# Patient Record
Sex: FEMALE | Race: AMERICAN INDIAN OR ALASKA NATIVE | Employment: OTHER | URBAN - METROPOLITAN AREA
[De-identification: names, ages, dates, MRNs, and addresses within clinical notes are randomized per-mention and may not be internally consistent; named-entity substitution may affect disease eponyms.]

---

## 2021-12-08 ENCOUNTER — TRANSFERRED RECORDS (OUTPATIENT)
Dept: HEALTH INFORMATION MANAGEMENT | Facility: CLINIC | Age: 56
End: 2021-12-08

## 2022-01-31 ENCOUNTER — MEDICAL CORRESPONDENCE (OUTPATIENT)
Dept: HEALTH INFORMATION MANAGEMENT | Facility: CLINIC | Age: 57
End: 2022-01-31

## 2022-08-08 ENCOUNTER — TELEPHONE (OUTPATIENT)
Dept: PALLIATIVE MEDICINE | Facility: CLINIC | Age: 57
End: 2022-08-08

## 2022-08-08 ENCOUNTER — OFFICE VISIT (OUTPATIENT)
Dept: INTERNAL MEDICINE | Facility: CLINIC | Age: 57
End: 2022-08-08
Payer: COMMERCIAL

## 2022-08-08 VITALS
SYSTOLIC BLOOD PRESSURE: 139 MMHG | WEIGHT: 127 LBS | HEART RATE: 77 BPM | TEMPERATURE: 98.1 F | OXYGEN SATURATION: 98 % | RESPIRATION RATE: 18 BRPM | DIASTOLIC BLOOD PRESSURE: 85 MMHG | BODY MASS INDEX: 20.41 KG/M2 | HEIGHT: 66 IN

## 2022-08-08 DIAGNOSIS — G43.719 INTRACTABLE CHRONIC MIGRAINE WITHOUT AURA AND WITHOUT STATUS MIGRAINOSUS: Primary | ICD-10-CM

## 2022-08-08 PROCEDURE — 99203 OFFICE O/P NEW LOW 30 MIN: CPT | Performed by: INTERNAL MEDICINE

## 2022-08-08 RX ORDER — BUPRENORPHINE AND NALOXONE 8; 2 MG/1; MG/1
FILM, SOLUBLE BUCCAL; SUBLINGUAL
Qty: 90 EACH | Refills: 0 | Status: SHIPPED | OUTPATIENT
Start: 2022-08-08 | End: 2022-11-01

## 2022-08-08 RX ORDER — KETOROLAC TROMETHAMINE 30 MG/ML
INJECTION, SOLUTION INTRAMUSCULAR; INTRAVENOUS
COMMUNITY
Start: 2021-12-14

## 2022-08-08 RX ORDER — ONDANSETRON 8 MG/1
TABLET, ORALLY DISINTEGRATING ORAL
COMMUNITY
Start: 2022-06-29

## 2022-08-08 RX ORDER — HYDROMORPHONE HYDROCHLORIDE 4 MG/1
4 TABLET ORAL EVERY 6 HOURS PRN
Qty: 50 TABLET | Refills: 0 | Status: SHIPPED | OUTPATIENT
Start: 2022-08-08 | End: 2022-10-13

## 2022-08-08 RX ORDER — LINACLOTIDE 290 UG/1
CAPSULE, GELATIN COATED ORAL
COMMUNITY
Start: 2022-06-21

## 2022-08-08 RX ORDER — MODAFINIL 200 MG/1
200 TABLET ORAL
COMMUNITY
Start: 2022-04-08

## 2022-08-08 RX ORDER — NORTRIPTYLINE HYDROCHLORIDE 75 MG/1
75 CAPSULE ORAL
COMMUNITY
End: 2023-01-10

## 2022-08-08 ASSESSMENT — ENCOUNTER SYMPTOMS
MUSCULOSKELETAL NEGATIVE: 1
CARDIOVASCULAR NEGATIVE: 1
RESPIRATORY NEGATIVE: 1
GASTROINTESTINAL NEGATIVE: 1
HEADACHES: 1
CONSTITUTIONAL NEGATIVE: 1
SEIZURES: 0

## 2022-08-08 NOTE — PATIENT INSTRUCTIONS
Pain referral placed for chronic migraine management.    Will provide 1 refill of Suboxone and Dilaudid as previously prescribed.

## 2022-08-08 NOTE — PROGRESS NOTES
Assessment & Plan     Intractable chronic migraine without aura and without status migrainosus  At this time, I will place a referral for the patient to establish with Dr. Antonette Gamble in the pain clinic.  Review of the [place] prescription monitoring service does show that she has been very consistent with her use of medications, and there was no concerning findings suggestive of misuse of her medications.  I did provide her with a refill of her Suboxone 8-2 mg per film with instructions to use 3 times per day as needed for chronic pain management secondary to migraines.  I also provide her with a one-time refill of Dilaudid 4 mg with instructions take 1 tab by mouth every 4 hours as needed for severe pain.  Patient did receive 50 tablets with no refills.  Side effects of each medication were discussed.  Patient and her  had no further questions or concerns at this time.  - Pain Management  Referral; Future  - HYDROmorphone (DILAUDID) 4 MG tablet; Take 1 tablet (4 mg) by mouth every 6 hours as needed for severe pain One tablet PRN migraine may repeat every 4 hrs as needed for chronic migraine pain.  - buprenorphine HCl-naloxone HCl (SUBOXONE) 8-2 MG per film; 6-8 mg sublingual TID PRN pain (may use in combination with 2 mg film) for chronic migraine pain.    Prescription drug management  30 minutes spent on the date of the encounter doing chart review, history and exam, documentation and further activities per the note       See Patient Instructions    Return in about 3 months (around 11/8/2022) for Routine preventive.    Patrice San MD  [facility] ELIEZER Davis is a 57 year old, presenting for the following health issues:  Headache      Patient is a 57-year-old  female who presents to the clinic as a new patient.  Her main concern today is in regards to a referral to a pain specialist.  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  Patient went through a process of being placed on several antiepileptic medications, and she was finally placed on a regimen of Tegretol and Topamax by her neurologist.  This has kept her seizure-free for approximately 20 years.  Has had issues with persistent migraine pain since these initial episodes that occur.  Her neurologist had tried a number of different medications in attempt to help control her migraine headaches, but unfortunately nothing was very successful.  She had previously been on nortriptyline, atenolol, and Imitrex.  She was ultimately placed on Demerol for management of her refractory migraines.  Patient was subsequent established with a pain specialist by the name of Dr. Howard Mccarthy.  Dr. Mccarthy had found a regimen of Suboxone and as needed Dilaudid that did help keep her migraine pain under relatively good control.  Unfortunately, Dr. Mccarthy did suffer some health issues, and is not currently practicing.  Patient was recommended to establish with Dr. Antonette Gamble for ongoing pain management.  Given that patient is new to the [facility], she did have to be seen in the clinic for a referral today.  Patient did receive her last prescription of her pain medication from Dr. Mccarthy approximately 2 months ago.  He did typically provide her with refills of her Suboxone and Dilaudid every 2 months.  Patient utilizes Dilaudid 4 mg on an as-needed basis for her migraine headaches.  She typically uses this medication 2-3 times per week at most.  She does utilize Suboxone 8 mg / 2 mg sublingually 3 times per day.  Her  is a physician who does assist her with management of her medications.  Her  does carefully track how frequently she is using her medications as well.  Patient is requesting a referral so that he can establish with Dr. Gamble for ongoing pain management.  " She did also request a refill of her prescriptions to cover her until she can be seen in the pain clinic.    History of Present Illness       Reason for visit:  Referral to a pain management specialist, Dr. Gamble    She eats 2-3 servings of fruits and vegetables daily.She consumes 1 sweetened beverage(s) daily.She exercises with enough effort to increase her heart rate 20 to 29 minutes per day.  She exercises with enough effort to increase her heart rate 4 days per week.   She is taking medications regularly.         Review of Systems   Constitutional: Negative.    HENT: Negative.    Respiratory: Negative.    Cardiovascular: Negative.    Gastrointestinal: Negative.    Breasts:  negative.    Genitourinary: Negative.    Musculoskeletal: Negative.    Neurological: Positive for headaches. Negative for seizures.            Objective    Blood pressure 139/85, pulse 77, temperature 98.1  F (36.7  C), resp. rate 18, height 1.676 m (5' 6\"), weight 57.6 kg (127 lb), SpO2 98 %, not currently breastfeeding.      Physical Exam  Vitals reviewed.   Constitutional:       General: She is not in acute distress.     Appearance: She is well-developed.   HENT:      Right Ear: Tympanic membrane and external ear normal.      Left Ear: Tympanic membrane and external ear normal.      Nose: Nose normal.      Mouth/Throat:      Pharynx: No oropharyngeal exudate.   Eyes:      General:         Right eye: No discharge.         Left eye: No discharge.      Conjunctiva/sclera: Conjunctivae normal.      Pupils: Pupils are equal, round, and reactive to light.   Neck:      Thyroid: No thyromegaly.      Trachea: No tracheal deviation.   Cardiovascular:      Rate and Rhythm: Normal rate and regular rhythm.      Pulses: Normal pulses.      Heart sounds: Normal heart sounds, S1 normal and S2 normal. No murmur heard.    No friction rub. No S3 or S4 sounds.   Pulmonary:      Effort: Pulmonary effort is normal. No respiratory distress.      Breath sounds: " Normal breath sounds. No wheezing or rales.   Abdominal:      General: Bowel sounds are normal.      Palpations: Abdomen is soft. There is no mass.      Tenderness: There is no abdominal tenderness.   Lymphadenopathy:      Cervical: No cervical adenopathy.   Skin:     General: Skin is warm and dry.      Findings: No rash.   Neurological:      Mental Status: She is alert and oriented to person, place, and time.      Motor: No abnormal muscle tone.      Deep Tendon Reflexes: Reflexes are normal and symmetric.                .  ..

## 2022-08-08 NOTE — TELEPHONE ENCOUNTER
A referral was received for this patient to schedule with Antonette Gamble, per Dr. Mccarthy's request. The provider the patient is being referred to is currently not taking new patients, please advise on scheduling

## 2022-08-11 NOTE — TELEPHONE ENCOUNTER
Routing to supervisory team to address    Leta GARCIA RN Care Coordinator  [facility]

## 2022-10-10 DIAGNOSIS — G43.719 INTRACTABLE CHRONIC MIGRAINE WITHOUT AURA AND WITHOUT STATUS MIGRAINOSUS: ICD-10-CM

## 2022-10-10 NOTE — TELEPHONE ENCOUNTER
Routing refill request to provider for review/approval because:  Drug not on the [facility] refill protocol     See message as well.

## 2022-10-13 RX ORDER — HYDROMORPHONE HYDROCHLORIDE 4 MG/1
TABLET ORAL
Qty: 50 TABLET | Refills: 0 | Status: SHIPPED | OUTPATIENT
Start: 2022-10-13

## 2022-10-13 NOTE — TELEPHONE ENCOUNTER
What message are you talking about?  I do not see any other message associated with this refill request.      Her appointment with pain clinic is scheduled for 111/22.

## 2022-10-28 NOTE — PROGRESS NOTES
"[facility] Consultation    Date of visit: 11/1/2022    Reason for consultation:    Susan Javed is a 57 year old female who is seen in consultation today at the request of her primary care physician, Yomi Collier.     Consultation and Evaluation for: \"intractible migraine\"    Review of [place] Prescription Monitoring Program (): Today I have also reviewed the patient's history of controlled substance use, as provided by [place] licensed pharmacies and prescriber dispensers. YES  Dilaudid 4mg #50 10/14/2022  Modafinil 200mg #180 9/29/2022  Suboxone 8mg film #90 9/13/2022    Review of Pain Questionnaire: Please see the [facility] health questionnaire, which the patient completed and reviewed with me in detail.    Review of Electronic Chart: Today I have also reviewed available medical information in the patient's medical record at [place] ([facility]), including relevant provider notes, laboratory work, and imaging.       Chief Complaint:    Chief Complaint   Patient presents with     Pain       Pain history:  Susan Javed is a 57 year old female who presents for initial evaluation of the following chronic pain complaints.     Had been seeing Dr. Howard Mccarthy for the past 22 years, saw him here at the [place] and then followed him to [place] [facility]. He referrered her to Dr. Gamble for further management.     Diagnosed with viral encephalitis in 1995. At age 31 had a grand mal seizure around this same time. Tried multiple anti-epileptics. Is now on tegretol and topiramate and this works very well for her seizures.     Since this time has migraines. Located in the left temporal area. Associated with nausea and vomitting. Usually occur after midnight. Tried many medications. Was on daily durogesic patch and hydropmorphone and was on this for about 10 years but was in a fog. So she started suboxone in 2010 and felt " cognitively much better. Suboxone 8mg TID, often only takes twice a day. Also had a perscription for 2mg to be able to take more when needed, she uses this very rarely.  She still has headaches every three days but she but catched them before they become more severe. To do this she takes 8 mg hydromorphone. She does have daily headaches that she takes excedrine for ( one in the morning). She typically has one bad migraine a month with nausea. Does not use NSAIDs. Has been on atenolol for 20 years for headaches. Rarely uses IM ketorolac injections.    Has tried acupuncture, posture control, biofeedback, cephaly, botox. She stopped botox when Dr. Mccarthy went on medical leave and didn't notice a big difference after stopping this.     She is happy with her current regimen and her  thinks she is doing better this year than previously.     Exercises every day 30-40 minutes a day on the treadmill. 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 She likes to read and travel. 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     Her brother just  5 days ago.    Pain is described as Throbbing she does have photophobia, phonophobia, nausea and vomitting  Aggravating factors include: shifting light, traveling  Relieving factors include: medication, lying down, ice  Activity level/function:              Daily activities:  Able to do all daily activities            Work:  Not applicable  Recent family or social stressors:  death in family:  Her brother  5 day ago  Red Flags:       MEDICATIONS FOR PAIN:   Suboxone 8-2 TID PRN  Dilaudid 8mg q72 hr PRN  Nortiptyline 75 mg hs  Ketorolac 30 mg IM q6 hr prn   Atenolol    For epilepsy:  Carbamazepine   Topiramate 275 mg BID    Also:  Promethazine  Modafinil - daytime drowsyness      Past pain treatments:   Pain Clinic:  Yes [place] [facility]    PT:  Yes   Psychologist:  No  Self-care:  Yes \      INJECTIONS:   BOTOX injections in the past  with Dr. Mccarthy    Social History:  Home situation: Lives with her  in [place]  Occupation/Schooling: retired physician  Tobacco use: none  Drug use: none  Alcohol use: none  History of chemical dependency treatment: none  Mental health admissions: none    Past Medical History:  No past medical history on file.    Past Surgical History:  No past surgical history on file.    Medications:  Current Outpatient Medications   Medication Sig Dispense Refill     ALEVE 220 MG OR TABS 1 TABLET BID PRN with menses       ATENOLOL 25 MG OR TABS 3 tabs BID       buprenorphine HCl-naloxone HCl (SUBOXONE) 8-2 MG per film 6-8 mg sublingual TID PRN pain (may use in combination with 2 mg film) for chronic migraine pain. 90 each 0     CALCIUM + D OR 1 tablet daily (Patient not taking: Reported on 8/8/2022)       Glucosamine-Chondroitin-MSM (GLUCOSAMINE-CHONDROITIN DS) TABS Take  by mouth. 750/500 mg., 3 tabs daily        HYDROmorphone (DILAUDID) 4 MG tablet TAKE 1 TABLET (4 MG) BY MOUTH EVERY 6 HOURS AS NEEDED FOR SEVERE MIGRAINE PAIN,  MAY REPEAT EVERY 4 HOURS AS NEEDED FOR CHRONIC MIGRAINE PAIN. 50 tablet 0     ketorolac (TORADOL) 30 MG/ML injection ADMINISTER 1 ML IN THE MUSCLE EVERY 6 HOURS       LINZESS 290 MCG capsule TAKE 1 CAPSULE BY MOUTH EVERY DAY       methylprednisoLONE (MEDROL, MANDEEP,) 4 MG tablet Use per directions on the mandeep 1 tablet 0     modafinil (PROVIGIL) 200 MG tablet Take 200 mg by mouth       modafinil (PROVIGIL) 200 MG tablet 1-2 po q day 60 tablet 3     Multiple Vitamin (MULTIVITAMIN OR) Take 1 tablet by mouth daily.       nortriptyline (PAMELOR) 75 MG capsule Take 75 mg by mouth       nortriptyline (PAMELOR) 75 MG capsule take 75 mg by mouth At Bedtime.       ondansetron (ZOFRAN ODT) 8 MG ODT tab DISSOLVE 1 TABLET UNDER THE TONGUE TWICE DAILY AS NEEDED       promethazine (PHENERGAN) 25 MG suppository Place 1 suppository rectally every 6 hours as needed for nausea. 12 each 0     Promethazine HCl  "(PHENERGAN) 50 MG/ML SOLN Inject  as directed. 1 amp PRN Nausea and vomiting (Patient not taking: Reported on 8/8/2022) 10 ampule 0     TEGRETOL XR## 400 MG OR TB12 1 TABLET TWICE DAILY       TOPAMAX 200 MG OR TABS 1 TABLET TWICE DAILY       TOPAMAX 25 MG OR TABS 3 tabs BID         Allergies:     Allergies   Allergen Reactions     Augmentin Rash     Sulfa Drugs Hives       Family history:  No family history on file.    ROS:  Constitutional, neuro, ENT, endocrine, pulmonary, cardiac, gastrointestinal, genitourinary, musculoskeletal, integument and psychiatric systems are negative, except as otherwise noted.    Physical Exam:  Vitals:    11/01/22 0930   BP: (!) 143/86   BP Location: Right leg   Patient Position: Sitting   Cuff Size: Adult Regular   Pulse: 84   Height: 1.676 m (5' 6\")       GENERAL: Healthy, alert and no distress  EYES: Eyes grossly normal to inspection.  No discharge or erythema, or obvious scleral/conjunctival abnormalities.  RESP: No audible wheeze, cough, or visible cyanosis.  No visible retractions or increased work of breathing.    SKIN: Visible skin clear. No significant rash, abnormal pigmentation or lesions.  NEURO: Cranial nerves grossly intact.  Mentation and speech appropriate for age.  PSYCH: Mentation appears normal, affect normal/bright, judgement and insight intact, normal speech and appearance well-groomed.      ASSESSMENT/PLAN:  The following recommendations were given to the patient. Diagnosis, treatment options, risks, benefits, and alternatives were discussed, and all questions were answered. The patient expressed understanding of the plan for management.     1. Intractable chronic migraine without aura and without status migrainosus  The patient has a long history of migraine headaches.  She has been using the same regiment of medications now for many years with success in the reduction and intensity of her headaches.  She is using suboxone 16mg daily and dilaudid 4mg -8mg PRN to " abort her headaches. She does not wish to change her medication regiment at this time.  However, we did discuss using less suboxone 1-2mg more often to better manage her headaches.      I was upfront in letting her know that I am not a headache specialist and she may want to have neurology also follow her if things change or worsen.  I am comfortable continuing this regiment for now.  Could consider botox injections again in the future if things worsen, however, she has been off this now for about 10 months and has not noticed any difference in her headache strength or frequency.     She denies any history of abuse/misuse of her medication and I could not find any evidence of OUD on chart review.  Dr. Mccarthy was using migraine HA's as the diagnosis code associated with her suboxone use.     She is not a patient of the [facility].  She went to a one time visit with a PCP in order to get a consult to come and see me but plans to continue with her long term PCP and [facility].     I am not comfortable prescribing modafinil.  She will need to get this from either her PCP or ask her primary for a sleep medicine evaluation as narcolepsy is the only approved diagnosis for this medication and she has never been evaluated for this.  It sounds like this was started to combat the side effect of sedation she was having when she was previously on fentanyl patches.     - Pain Management  Referral  - buprenorphine HCl-naloxone HCl (SUBOXONE) 8-2 MG per film; 4mg or 1/2 film QID for chronic migraine pain for a total of 16mg daily  Dispense: 60 each; Refill: 5    2. Encounter for long-term (current) use of high-risk medication  High Risk Drug Monitoring?  YES  Drug being monitored: Suboxone   Reason for drug: Opioid Use Disorder  What is being monitored?: Dosage, Cravings, Trigger, side effects, and abstinence.    - Drug Confirmation Panel Urine with Creatinine; Future  - Ethyl Glucuronide Screen with Reflex to  Confirmation, Urine; Future    UDS AND CONTRACT WERE DONE TODAY      MEDICATIONS:     Orders Placed This Encounter   Medications     buprenorphine HCl-naloxone HCl (SUBOXONE) 8-2 MG per film     Simg or 1/2 film QID for chronic migraine pain for a total of 16mg daily     Dispense:  60 each     Refill:  5     NADEAN: [id number]          - Continue other medications without change           FOLLOW UP: every 3-6 months in clinic -  2023      BILLING TIME DOCUMENTATION:   The total TIME spent on this patient on the date of the encounter/appointment was 72 minutes.      TOTAL TIME includes:   Time spent preparing to see the patient (reviewing records and tests) - 2 min  Time spent face to face (or over the phone) with the patient - 56 min  Time spent ordering tests, medications, procedures and referrals - 2 min  Time spent Referring and communicating with other healthcare professionals - 0 min  Time spent documenting clinical information in Epic - 12 min       SHAHEED TERAN MD   Pain Management & Addiction Medicine

## 2022-11-01 ENCOUNTER — OFFICE VISIT (OUTPATIENT)
Dept: PALLIATIVE MEDICINE | Facility: CLINIC | Age: 57
End: 2022-11-01
Attending: INTERNAL MEDICINE
Payer: COMMERCIAL

## 2022-11-01 VITALS
HEIGHT: 66 IN | BODY MASS INDEX: 20.5 KG/M2 | HEART RATE: 84 BPM | DIASTOLIC BLOOD PRESSURE: 86 MMHG | SYSTOLIC BLOOD PRESSURE: 143 MMHG

## 2022-11-01 DIAGNOSIS — G43.719 INTRACTABLE CHRONIC MIGRAINE WITHOUT AURA AND WITHOUT STATUS MIGRAINOSUS: Primary | ICD-10-CM

## 2022-11-01 DIAGNOSIS — Z79.899 ENCOUNTER FOR LONG-TERM (CURRENT) USE OF HIGH-RISK MEDICATION: ICD-10-CM

## 2022-11-01 LAB — CREAT UR-MCNC: 58 MG/DL

## 2022-11-01 PROCEDURE — 80307 DRUG TEST PRSMV CHEM ANLYZR: CPT | Mod: 90 | Performed by: ANESTHESIOLOGY

## 2022-11-01 PROCEDURE — 80307 DRUG TEST PRSMV CHEM ANLYZR: CPT | Performed by: ANESTHESIOLOGY

## 2022-11-01 PROCEDURE — 99205 OFFICE O/P NEW HI 60 MIN: CPT | Performed by: ANESTHESIOLOGY

## 2022-11-01 PROCEDURE — 80321 ALCOHOLS BIOMARKERS 1OR 2: CPT | Mod: 90 | Performed by: ANESTHESIOLOGY

## 2022-11-01 RX ORDER — BUPRENORPHINE AND NALOXONE 8; 2 MG/1; MG/1
FILM, SOLUBLE BUCCAL; SUBLINGUAL
Qty: 60 EACH | Refills: 5 | Status: SHIPPED | OUTPATIENT
Start: 2022-11-01 | End: 2023-04-12

## 2022-11-01 ASSESSMENT — PAIN SCALES - GENERAL: PAINLEVEL: NO PAIN (0)

## 2022-11-01 NOTE — LETTER
Opioid / Opioid Plus Controlled Substance Agreement    This is an agreement between you and your provider about the safe and appropriate use of controlled substance/opioids prescribed by your care team. Controlled substances are medicines that can cause physical and mental dependence (abuse).    There are strict laws about having and using these medicines. We here at [facility] are committing to working with you in your efforts to get better. To support you in this work, we ll help you schedule regular office appointments for medicine refills. If we must cancel or change your appointment for any reason, we ll make sure you have enough medicine to last until your next appointment.     As a Provider, I will:    Listen carefully to your concerns and treat you with respect.     Recommend a treatment plan that I believe is in your best interest. This plan may involve therapies other than opioid pain medication.     Talk with you often about the possible benefits, and the risk of harm of any medicine that we prescribe for you.     Provide a plan on how to taper (discontinue or go off) using this medicine if the decision is made to stop its use.    As a Patient, I understand that opioid(s):     Are a controlled substance prescribed by my care team to help me function or work and manage my condition(s).     Are strong medicines and can cause serious side effects such as:    Drowsiness, which can seriously affect my driving ability    A lower breathing rate, enough to cause death    Harm to my thinking ability     Depression     Abuse of and addiction to this medicine    Need to be taken exactly as prescribed. Combining opioids with certain medicines or chemicals (such as illegal drugs, sedatives, sleeping pills, and benzodiazepines) can be dangerous or even fatal. If I stop opioids suddenly, I may have severe withdrawal symptoms.    Do not work for all types of pain nor for all patients. If they re not helpful, I may  be asked to stop them.        The risks, benefits and side effects of these medicine(s) were explained to me. I agree that:  1. I will take part in other treatments as advised by my care team. This may be psychiatry or counseling, physical therapy, behavioral therapy, group treatment or a referral to a specialist.     2. I will keep all my appointments. I understand that this is part of the monitoring of opioids. My care team may require an office visit for EVERY opioid/controlled substance refill. If I miss appointments or don t follow instructions, my care team may stop my medicine.    3. I will take my medicines as prescribed. I will not change the dose or schedule unless my care team tells me to. There will be no refills if I run out early.     4. I may be asked to come to the clinic and complete a urine drug test or complete a pill count at any time. If I don t give a urine sample or participate in a pill count, the care team may stop my medicine.    5. I will only receive prescriptions from this clinic for chronic pain. If I am treated by another provider for acute pain issues, I will tell them that I am taking opioid pain medication for chronic pain and that I have a treatment agreement with this provider. I will inform my [facility] care team within one business day if I am given a prescription for any pain medication by another healthcare provider. My [facility] care team can contact other providers and pharmacists about my use of any medicines.    6. It is up to me to make sure that I don t run out of my medicines on weekends or holidays. If my care team is willing to refill my opioid prescription without a visit, I must request refills only during office hours. Refills may take up to 3 business days to process. I will use one pharmacy to fill all my opioid and other controlled substance prescriptions. I will notify the clinic about any changes to my insurance or medication  availability.    7. I am responsible for my prescriptions. If the medicine/prescription is lost, stolen or destroyed, it will not be replaced. I also agree not to share controlled substance medicines with anyone.    8. I am aware I should not use any illegal or recreational drugs. I agree not to drink alcohol unless my care team says I can.       9. If I enroll in the [place] [facility] Cannabis program, I will tell my care team prior to my next refill.     10. I will tell my care team right away if I become pregnant, have a new medical problem treated outside of my regular clinic, or have a change in my medications.    11. I understand that this medicine can affect my thinking, judgment and reaction time. Alcohol and drugs affect the brain and body, which can affect the safety of my driving. Being under the influence of alcohol or drugs can affect my decision-making, behaviors, personal safety, and the safety of others. Driving while impaired (DWI) can occur if a person is driving, operating, or in physical control of a car, motorcycle, boat, snowmobile, ATV, motorbike, off-road vehicle, or any other motor vehicle ([place] Statute 169A.20). I understand the risk if I choose to drive or operate any vehicle or machinery.    I understand that if I do not follow any of the conditions above, my prescriptions or treatment may be stopped or changed.          Opioids  What You Need to Know    What are opioids?   Opioids are pain medicines that must be prescribed by a doctor. They are also known as narcotics.     Examples are:   1. morphine (MS Contin, Radha)  2. oxycodone (Oxycontin)  3. oxycodone and acetaminophen (Percocet)  4. hydrocodone and acetaminophen (Vicodin, Norco)   5. fentanyl patch (Duragesic)   6. hydromorphone (Dilaudid)   7. methadone  8. codeine (Tylenol #3)     What do opioids do well?   Opioids are best for severe short-term pain such as after a surgery or injury. They may work well for cancer pain. They may  help some people with long-lasting (chronic) pain.     What do opioids NOT do well?   Opioids never get rid of pain entirely, and they don t work well for most patients with chronic pain. Opioids don t reduce swelling, one of the causes of pain.                                    Other ways to manage chronic pain and improve function include:       Treat the health problem that may be causing pain    Anti-inflammation medicines, which reduce swelling and tenderness, such as ibuprofen (Advil, Motrin) or naproxen (Aleve)    Acetaminophen (Tylenol)    Antidepressants and anti-seizure medicines, especially for nerve pain    Topical treatments such as patches or creams    Injections or nerve blocks    Chiropractic or osteopathic treatment    Acupuncture, massage, deep breathing, meditation, visual imagery, aromatherapy    Use heat or ice at the pain site    Physical therapy     Exercise    Stop smoking    Take part in therapy       Risks and side effects     Talk to your doctor before you start or decide to keep taking opioids. Possible side effects include:      Lowering your breathing rate enough to cause death    Overdose, including death, especially if taking higher than prescribed doses    Worse depression symptoms; less pleasure in things you usually enjoy    Feeling tired or sluggish    Slower thoughts or cloudy thinking    Being more sensitive to pain over time; pain is harder to control    Trouble sleeping or restless sleep    Changes in hormone levels (for example, less testosterone)    Changes in sex drive or ability to have sex    Constipation    Unsafe driving    Itching and sweating    Dizziness    Nausea, throwing up and dry mouth    What else should I know about opioids?    Opioids may lead to dependence, tolerance, or addiction.      Dependence means that if you stop or reduce the medicine too quickly, you will have withdrawal symptoms. These include loose poop (diarrhea), jitters, flu-like symptoms,  nervousness and tremors. Dependence is not the same as addiction.                       Tolerance means needing higher doses over time to get the same effect. This may increase the chance of serious side effects.      Addiction is when people improperly use a substance that harms their body, their mind or their relations with others. Use of opiates can cause a relapse of addiction if you have a history of drug or alcohol abuse.      People who have used opioids for a long time may have a lower quality of life, worse depression, higher levels of pain and more visits to doctors.    You can overdose on opioids. Take these steps to lower your risk of overdose:    1. Recognize the signs:  Signs of overdose include decrease or loss of consciousness (blackout), slowed breathing, trouble waking up and blue lips. If someone is worried about overdose, they should call 911.    2. Talk to your doctor about Narcan (naloxone).   If you are at risk for overdose, you may be given a prescription for Narcan. This medicine very quickly reverses the effects of opioids.   If you overdose, a friend or family member can give you Narcan while waiting for the ambulance. They need to know the signs of overdose and how to give Narcan.     3. Don't use alcohol or street drugs.   Taking them with opioids can cause death.    4. Do not take any of these medicines unless your doctor says it s OK. Taking these with opioids can cause death:    Benzodiazepines, such as lorazepam (Ativan), alprazolam (Xanax) or diazepam (Valium)    Muscle relaxers, such as cyclobenzaprine (Flexeril)    Sleeping pills like zolpidem (Ambien)     Other opioids      How to keep you and other people safe while taking opioids:    1. Never share your opioids with others.  Opioid medicines are regulated by the Drug Enforcement Agency ([place]). Selling or sharing medications is a criminal act.    2. Be sure to store opioids in a secure place, locked up if possible. Young children  can easily swallow them and overdose.    3. When you are traveling with your medicines, keep them in the original bottles. If you use a pill box, be sure you also carry a copy of your medicine list from your clinic or pharmacy.    4. Safe disposal of opioids    Most pharmacies have places to get rid of medicine, called disposal kiosks. Medicine disposal options are also available in every [facility]. Search your county and  medication disposal  to find more options. You can find more details at:  [email or web address]     I agree that my provider, clinic care team, and pharmacy may work with any city, state or federal law enforcement agency that investigates the misuse, sale, or other diversion of my controlled medicine. I will allow my provider to discuss my care with, or share a copy of, this agreement with any other treating provider, pharmacy or emergency room where I receive care.    I have read this agreement and have asked questions about anything I did not understand.    _______________________________________________________  Patient Signature - Susan Javed _____________________                   Date     _______________________________________________________  Provider Signature - Antonette Gamble MD   _____________________                   Date     _______________________________________________________  Witness Signature (required if provider not present while patient signing)   _____________________                   Date

## 2022-11-01 NOTE — PATIENT INSTRUCTIONS
Suboxone will have refills on it so you call the pharmacy for refills. It is okay to cut the films.   For the Dilaudid you are going to call here - the pain clinic for all refills.     ----------------------------------------------------------------  Clinic Number:  [phone]   Call with any questions about your care and for scheduling assistance.   Calls are returned Monday through Friday between 8 AM and 4:30 PM. We usually get back to you within 2 business days depending on the issue/request.    If we are prescribing your medications:  For opioid medication refills, call the clinic or send a [facility] message 7 days in advance.  Please include:  Name of requested medication  Name of the pharmacy.  For non-opioid medications, call your pharmacy directly to request a refill. Please allow 3-4 days to be processed.   Per [place] State Law:  All controlled substance prescriptions must be filled within 30 days of being written.    For those controlled substances allowing refills, pickup must occur within 30 days of last fill.      We believe regular attendance is key to your success in our program!    Any time you are unable to keep your appointment we ask that you call us at least 24 hours in advance to cancel.This will allow us to offer the appointment time to another patient.   Multiple missed appointments may lead to dismissal from the clinic.

## 2022-11-03 LAB
BUPRENORPHINE UR CFM-MCNC: 154 NG/ML
BUPRENORPHINE/CREAT UR: 266 NG/MG {CREAT}
ETHYL GLUCURONIDE UR QL SCN: POSITIVE NG/ML
HYDROMORPHONE UR CFM-MCNC: 280 NG/ML
HYDROMORPHONE/CREAT UR: 483 NG/MG {CREAT}
NALOXONE UR CFM-MCNC: 1417 NG/ML
NALOXONE: 2443 NG/MG {CREAT}
NORBUPRENORPHINE UR CFM-MCNC: 1361 NG/ML
NORBUPRENORPHINE/CREAT UR: 2347 NG/MG {CREAT}

## 2022-11-05 LAB
ETHYL GLUCURONIDE UR CFM-MCNC: NORMAL NG/ML
ETHYL SULFATE UR CFM-MCNC: 5573 NG/ML

## 2022-11-15 ENCOUNTER — TELEPHONE (OUTPATIENT)
Dept: PALLIATIVE MEDICINE | Facility: CLINIC | Age: 57
End: 2022-11-15

## 2022-11-15 NOTE — TELEPHONE ENCOUNTER
Called pt- scheduled for 12/20/22 in person, requested in person, offered 12/06 but is unable to come in prior due to travel for 3 weeks. Aware that provider unable to continue prescribing Dilaudid due to UDS results.   States that does have refills on file for suboxone but will be due to start while on vacation 12/01/22 start, leaving the 25th. Inquired if able to  prior to leaving vacation. Discussed with provider, ok to  early. Called pharmacy and advised ok to  early    Yashira TUBBS, RN Care Coordinator  [facility]  Pain Management    
Called pt. LM to call back to discuss at home number    Called mobile listed. LM to call back to discuss    Yashira TUBBS, RN Care Coordinator  [facility]  Pain Management    
[facility] Call Center    Phone Message    May a detailed message be left on voicemail: yes     Reason for Call: Other: Patient is returning a call to RN regarding missed phone call to move appointment up sooner. Please call to discuss.      Action Taken: Other: Pain    Travel Screening: Not Applicable                                                                      
Please call the patient -     Her UDS came up positive for alcohol use.      As stated on the opioid contract she signed, alcohol is strictly prohibited when using the medications she is on.     I will need an appointment with her sooner than currently scheduled to discuss this and come up with an alternative plan as I can no longer prescribe dilaudid.     Okay to use an urgent slot.     Antonette Gamble MD   
same name as above

## 2023-01-09 NOTE — PROGRESS NOTES
Susan is a 57 year old who is being evaluated via a billable video visit.      How would you like to obtain your AVS? MyChart  If the video visit is dropped, the invitation should be resent by: Text to cell phone: [phone]  Will anyone else be joining your video visit? No      Video-Visit Details    Type of service:  Video Visit   Video Start Time: 1001  Video End Time:1029    Originating Location (pt. Location): Home    Distant Location (provider location):  On-site  Platform used for Video Visit: [facility]      Date of visit: 1/10/2023    Chief complaint:   Chief Complaint   Patient presents with     Pain       Interval history:  Susan Javed is a 57 year old female last seen by me for INITIAL CONSULT on 11/1/2022 and for FOLLOW UP- today is her first follow up.       Since her last visit, Susan Javed reports:    - Doing okay  - [place] are going to the [place] and will be there until April 2022.   - Had alcohol in her UDS on initial consult and dilaudid prescription was cancelled.  She had a backup stash and has stayed on it since her initial consult with me.   - She is trying to decrease over time and her goal is to get off traditional opioids.  She was previously taking #25 tablets/month and last month took #17/month.   - Went and established care at [facility] last week and they are now managing her dilaudid.  She did a UDS and contract with them and they are going to help her get off it by slowly decreasing it month by month.   - They also are starting her on #2 new migraine medications that are non opioid and she will start these once a PA is approved.    - She is using buprenorphine 16mg daily. 4mg upon waking, 4mg lunch, 4mg dinner and 4mg PRN.  She does not use it after 4pm.    - Had been seeing Dr. Howard Mccarthy for the past 22 years, saw him here at the [facility] and then followed him to [facility]. She was referred to see my after he left his practice a few months ago   - Diagnosed with viral encephalitis in 1995. At age 31 she had a grand mal seizure. Tried multiple anti-epileptics. Is now on tegretol and topiramate and this works very well for her seizures.   - Primary pain complaint is migraine HA which started after her encephalitis.   - Located in the left temporal area. Associated with nausea and vomitting. Usually occur after midnight. Tried many medications. Was on daily durogesic patch and hydropmorphone and was on this for about 10 years but was in a fog. So she started suboxone in 2010 and felt cognitively much better. Headaches every 3 days.   She manages these with 8 mg hydromorphone PRN. She does have daily headaches that she takes excedrine for ( one in the morning). She typically has one really bad migraine a month with nausea. Does not use NSAIDs.   - Has been on atenolol for 20 years for headaches. Rarely uses IM ketorolac injections.  - Has tried acupuncture, posture control, biofeedback, cephaly, botox.    - Exercises every day 30-40 minutes a day on the treadmill.  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    - Her brother passed away 5 days prior to her initial consult and she admits to drinking at that time.  She states she is no longer drinking alcohol.   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     [place]  REVIEWED TODAY: YES  DILAUDID 4MG #20 1/5/2023  SUBOXONE 8MG #60 1/2/2023  MODAFINIL 200MG #90 12/29/2022    UDS and CONTRACT DONE on 11/1/2022 - positive for ETG, ETS and buprenorphine     MEDICATIONS FOR PAIN:   SUBOXONE 8MG BID  DILAUDID 4MG PRN - prescribed by [facility]  PAMELOR 75MG at bedtime  TOPAMAX 200MG DAILY & 25MG TID (for epilepsy)  ATENOLOL 75MG BID  TORADOL IM PRN     INJECTIONS/SURGERY:  BOTOX injections in the past with Dr. Mccarthy - [place]      Medications:  Current Outpatient Medications   Medication Sig Dispense  Refill     ALEVE 220 MG OR TABS 1 TABLET BID PRN with menses       ATENOLOL 25 MG OR TABS 3 tabs BID       buprenorphine HCl-naloxone HCl (SUBOXONE) 8-2 MG per film 4mg or 1/2 film QID for chronic migraine pain for a total of 16mg daily 60 each 5     CALCIUM + D OR 1 tablet daily (Patient not taking: Reported on 8/8/2022)       HYDROmorphone (DILAUDID) 4 MG tablet TAKE 1 TABLET (4 MG) BY MOUTH EVERY 6 HOURS AS NEEDED FOR SEVERE MIGRAINE PAIN,  MAY REPEAT EVERY 4 HOURS AS NEEDED FOR CHRONIC MIGRAINE PAIN. 50 tablet 0     ketorolac (TORADOL) 30 MG/ML injection ADMINISTER 1 ML IN THE MUSCLE EVERY 6 HOURS       LINZESS 290 MCG capsule TAKE 1 CAPSULE BY MOUTH EVERY DAY       methylprednisoLONE (MEDROL, MANDEEP,) 4 MG tablet Use per directions on the mandeep (Patient not taking: Reported on 11/1/2022) 1 tablet 0     modafinil (PROVIGIL) 200 MG tablet Take 200 mg by mouth       modafinil (PROVIGIL) 200 MG tablet 1-2 po q day 60 tablet 3     Multiple Vitamin (MULTIVITAMIN OR) Take 1 tablet by mouth daily.       nortriptyline (PAMELOR) 75 MG capsule Take 75 mg by mouth       nortriptyline (PAMELOR) 75 MG capsule take 75 mg by mouth At Bedtime.       ondansetron (ZOFRAN ODT) 8 MG ODT tab DISSOLVE 1 TABLET UNDER THE TONGUE TWICE DAILY AS NEEDED       promethazine (PHENERGAN) 25 MG suppository Place 1 suppository rectally every 6 hours as needed for nausea. 12 each 0     Promethazine HCl (PHENERGAN) 50 MG/ML SOLN Inject  as directed. 1 amp PRN Nausea and vomiting (Patient not taking: Reported on 8/8/2022) 10 ampule 0     TEGRETOL XR## 400 MG OR TB12 1 TABLET TWICE DAILY       TOPAMAX 200 MG OR TABS 1 TABLET TWICE DAILY       TOPAMAX 25 MG OR TABS 3 tabs BID         Physical Exam:  not currently breastfeeding.    GENERAL: Healthy, alert and no distress  EYES: Eyes grossly normal to inspection.  No discharge or erythema, or obvious scleral/conjunctival abnormalities.  RESP: No audible wheeze, cough, or visible cyanosis.  No visible  retractions or increased work of breathing.    SKIN: Visible skin clear. No significant rash, abnormal pigmentation or lesions.  NEURO: Cranial nerves grossly intact.  Mentation and speech appropriate for age.  PSYCH: Mentation appears normal, affect normal/bright, judgement and insight intact, normal speech and appearance well-groomed.         ASSESSMENT/PLAN:     1. Intractable migraine without aura and without status migrainosus  The patient has a long history of migraine headaches.  She has been using the same regiment of medications now for many years with success in the reduction and intensity of her headaches.  She is using suboxone 16mg daily and dilaudid 4mg -8mg PRN to abort her headaches.     At her initial consult with me in November we did a urine drug screen and it was positive for alcohol.  Upon finding this out I discontinued her Dilaudid prescription.  However it turns out she had a lot leftover at home and has not stopped taking this.  She recently went and establish care at [facility] pain [facility] and they are going to help her manage a Dilaudid taper.  She does hope to get off of traditional opiates over time.   She states they have other ideas for managing her headaches and have prescribed 2 nonopioid medications and she is waiting for a prior authorization to start these.  She is aware that they can take over care and also prescribe her Suboxone, however she would like to continue to have me prescribe this for now.    They are leaving next week for [place] for the winter.  She will return at the beginning of April.  She would like to follow up with me at that time in [place].      She is not a patient of the [facility] system.  She went to a one time visit with a PCP in order to get a consult to come and see me but plans to continue with her long term PCP and [facility]. I cannot see her records from [facility] at this time.      Continue suboxone - she has a prescription from her last  visit.   - buprenorphine HCl-naloxone HCl (SUBOXONE) 8-2 MG per film; 4mg or 1/2 film QID for chronic migraine pain for a total of 16mg daily  Dispense: 60 each; Refill: 5     2. Encounter for long-term (current) use of high-risk medication  High Risk Drug Monitoring?  YES  Drug being monitored: Suboxone   Reason for drug: Opioid Use Disorder  What is being monitored?: Dosage, Cravings, Trigger, side effects, and abstinence.        MEDICATIONS:   No orders of the defined types were placed in this encounter.      FOLLOW UP:  Every 3 months 4/5/2022 @ 2PM IN PERSON [place]        BILLING TIME DOCUMENTATION:   The total TIME spent on this patient on the date of the encounter/appointment was 44 minutes.      TOTAL TIME includes:   Time spent preparing to see the patient (reviewing records and tests) - 5 min  Time spent face to face (or over the phone) with the patient - 29 min  Time spent ordering tests, medications, procedures and referrals - 0 min  Time spent Referring and communicating with other healthcare professionals - 0 min  Time spent documenting clinical information in Epic - 10 min      SHAHEED TERAN MD   Pain Management & Addiction Medicine

## 2023-01-10 ENCOUNTER — VIRTUAL VISIT (OUTPATIENT)
Dept: PALLIATIVE MEDICINE | Facility: CLINIC | Age: 58
End: 2023-01-10
Payer: COMMERCIAL

## 2023-01-10 DIAGNOSIS — G43.019 INTRACTABLE MIGRAINE WITHOUT AURA AND WITHOUT STATUS MIGRAINOSUS: Primary | ICD-10-CM

## 2023-01-10 DIAGNOSIS — Z79.899 ENCOUNTER FOR LONG-TERM (CURRENT) USE OF HIGH-RISK MEDICATION: ICD-10-CM

## 2023-01-10 PROCEDURE — 99214 OFFICE O/P EST MOD 30 MIN: CPT | Mod: 95 | Performed by: ANESTHESIOLOGY

## 2023-04-02 ENCOUNTER — HEALTH MAINTENANCE LETTER (OUTPATIENT)
Age: 58
End: 2023-04-02

## 2023-04-11 NOTE — PROGRESS NOTES
[facility]      Date of visit: 4/12/2023    Chief complaint:   Chief Complaint   Patient presents with     Pain       Interval history:  Susan Javed is a 58 year old female last seen by me for INITIAL CONSULT on 11/1/2022 and for FOLLOW UP on 1/10/2023 VIRTUAL VISIT.       Since her last visit, Susan Javed reports:    - Doing well  - Her  is present for the in person visit today.   - Primary pain complaint is migraine HA which started after her encephalitis diagnosis in 1995.   - Her pain is located in the left temporal area. Associated with nausea and vomitting. Usually occur after midnight. She does also have daily headaches that she takes excedrine for ( one in the morning). She typically has one really bad migraine a month with nausea. Does not use NSAIDs.   - She continues to follow with [facility] for dilaudid, headache management and injections. She is only seeing me for Suboxone management at this time.   - Starting emgality  - S/P Sphenopalatine supraorbital ridge block on the left side today at [facility].   - She has been able to decrease her dilaudid to 8-10 4mg tablets/month.  She was previously taking #25 tablets/month.   - She is using buprenorphine 16mg daily. 4mg upon waking, 4mg lunch, 4mg dinner and 4mg PRN.  She does not use it after 4pm.    - She has also been using a leftover box of 2mg films on a PRN basis and is asking to have this refilled.   - At age 31 she had a grand mal seizure. Tried multiple anti-epileptics. Is now on tegretol and topiramate and this works very well for her seizures.   - Has been on atenolol for 20 years for headaches. Rarely uses IM ketorolac injections.  - Has tried acupuncture, posture control, biofeedback, cephaly, botox.    - Exercises every day 30-40 minutes a day on the treadmill.  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    - She states she is no longer drinking  alcohol.   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       Patient reported symptoms:  Patient Supplied Answers To the [facility] Pain Questionnaire       View : No data to display.                No images are attached to the encounter.    [place]  REVIEWED TODAY: YES  DILAUDID 4MG #15 3/28/2023  SUBOXONE 8MG #60 4/3/2023  MODAFINIL 200MG #90 12/29/2022    UDS and CONTRACT DONE on 11/1/2022 - positive for ETG, ETS and buprenorphine     MEDICATIONS FOR PAIN:   SUBOXONE 8MG BID  DILAUDID 4MG PRN - prescribed by [facility]  PAMELOR 75MG at bedtime  TOPAMAX 200MG DAILY & 25MG TID (for epilepsy)  ATENOLOL 75MG BID  TORADOL IM PRN     INJECTIONS/SURGERY:  BOTOX injections in the past with Dr. Mccarthy - [facility]    IMAGING:   No new     LABS:   reviewed    Medications:  Current Outpatient Medications   Medication Sig Dispense Refill     ALEVE 220 MG OR TABS 1 TABLET BID PRN with menses       ATENOLOL 25 MG OR TABS 3 tabs BID       buprenorphine HCl-naloxone HCl (SUBOXONE) 8-2 MG per film 4mg or 1/2 film QID for chronic migraine pain for a total of 16mg daily 60 each 5     CALCIUM + D OR        HYDROmorphone (DILAUDID) 4 MG tablet TAKE 1 TABLET (4 MG) BY MOUTH EVERY 6 HOURS AS NEEDED FOR SEVERE MIGRAINE PAIN,  MAY REPEAT EVERY 4 HOURS AS NEEDED FOR CHRONIC MIGRAINE PAIN. 50 tablet 0     ketorolac (TORADOL) 30 MG/ML injection ADMINISTER 1 ML IN THE MUSCLE EVERY 6 HOURS       LINZESS 290 MCG capsule TAKE 1 CAPSULE BY MOUTH EVERY DAY       modafinil (PROVIGIL) 200 MG tablet Take 200 mg by mouth       Multiple Vitamin (MULTIVITAMIN OR) Take 1 tablet by mouth daily.       nortriptyline (PAMELOR) 75 MG capsule take 75 mg by mouth At Bedtime.       ondansetron (ZOFRAN ODT) 8 MG ODT tab DISSOLVE 1 TABLET UNDER THE TONGUE TWICE DAILY AS NEEDED       promethazine (PHENERGAN) 25 MG suppository Place 1 suppository rectally every 6 hours as needed for nausea. 12 each 0     TEGRETOL XR## 400 MG OR TB12 1 TABLET TWICE DAILY       TOPAMAX  200 MG OR TABS 1 TABLET TWICE DAILY       TOPAMAX 25 MG OR TABS 3 tabs BID           Physical Exam:  Blood pressure 133/88, pulse 83, weight 65.8 kg (145 lb 1.6 oz), SpO2 99 %, not currently breastfeeding.    GENERAL: Healthy, alert and no distress  EYES: Eyes grossly normal to inspection.  No discharge or erythema, or obvious scleral/conjunctival abnormalities.  RESP: No audible wheeze, cough, or visible cyanosis.  No visible retractions or increased work of breathing.    SKIN: Visible skin clear. No significant rash, abnormal pigmentation or lesions.  NEURO: Cranial nerves grossly intact.  Mentation and speech appropriate for age.  PSYCH: Mentation appears normal, affect normal/bright, judgement and insight intact, normal speech and appearance well-groomed.         ASSESSMENT/PLAN:   Susan Javed is a 58 year old female has a past medical history of viral encephalitis, migraine headaches, tension headaches and mental health history significant for hypersomnia and adjustment disorder and is being seen at the pain clinic for suboxone management for her chronic pain.  She was referred to me by Dr. Howard Mccarthy whom she had been seeing for the past 22 years at [facility]. Her initial UDS was positive for alcohol so she was taken off dilaudid and her suboxone was increased to 16mg daily.  She then established care with [facility] for her headaches and they are helping her to taper off her dilaudid which she continues to take PRN for her headaches in addition to her suboxone.     1. Intractable migraine without aura and without status migrainosus  She continues to use Suboxone 16mg daily AND 2mg PRN (from an old prescription). She has reduced her dilaudid to 8-10 4mg tablets monthly in the last 3 months and is getting this prescription from [facility] who are also no managing her headaches. She is starting Emgality this week and had an orbital block done today.     She is aware that they can take over care and  also prescribe her Suboxone, however she would like to continue to have me prescribe this for now.     She is not a patient of the [facility] system.  She went to a one time visit with a PCP in order to get a consult to come and see me but plans to continue with her long term PCP and [facility]. I cannot see her records from [facility].      Plan to continue Suboxone 16mg daily plus 2mg PRN.  We did discuss tapering down on this over time.      - buprenorphine HCl-naloxone HCl (SUBOXONE) 8-2 MG per film; 4mg or 1/2 film QID for chronic migraine pain for a total of 16mg daily  Dispense: 60 each; Refill: 5  - buprenorphine HCl-naloxone HCl (SUBOXONE) 2-0.5 MG per film; Place 1 Film under the tongue daily as needed (pain)  Dispense: 30 Film; Refill: 5    2. Encounter for long-term (current) use of high-risk medication  High Risk Drug Monitoring?  YES  Drug being monitored: Suboxone   Reason for drug: Opioid Use Disorder  What is being monitored?: Dosage, Cravings, Trigger, side effects, and abstinence.        MEDICATIONS:   Orders Placed This Encounter   Medications     buprenorphine HCl-naloxone HCl (SUBOXONE) 8-2 MG per film     Simg or 1/2 film QID for chronic migraine pain for a total of 16mg daily     Dispense:  60 each     Refill:  5     buprenorphine HCl-naloxone HCl (SUBOXONE) 2-0.5 MG per film     Sig: Place 1 Film under the tongue daily as needed (pain)     Dispense:  30 Film     Refill:  5       FOLLOW UP:  EVERY 6 months - 2023        BILLING TIME DOCUMENTATION:   The total TIME spent on this patient on the date of the encounter/appointment was 48 minutes.      TOTAL TIME includes:   Time spent preparing to see the patient (reviewing records and tests) - 6 min  Time spent face to face (or over the phone) with the patient - 32 min  Time spent ordering tests, medications, procedures and referrals - 2 min  Time spent Referring and communicating with other healthcare professionals - 0 min  Time  spent documenting clinical information in Epic - 8 min      SHAHEED TERAN MD   Pain Management

## 2023-04-12 ENCOUNTER — OFFICE VISIT (OUTPATIENT)
Dept: PALLIATIVE MEDICINE | Facility: CLINIC | Age: 58
End: 2023-04-12
Payer: COMMERCIAL

## 2023-04-12 VITALS
OXYGEN SATURATION: 99 % | HEART RATE: 83 BPM | DIASTOLIC BLOOD PRESSURE: 88 MMHG | WEIGHT: 145.1 LBS | BODY MASS INDEX: 23.42 KG/M2 | SYSTOLIC BLOOD PRESSURE: 133 MMHG

## 2023-04-12 DIAGNOSIS — G43.719 INTRACTABLE CHRONIC MIGRAINE WITHOUT AURA AND WITHOUT STATUS MIGRAINOSUS: Primary | ICD-10-CM

## 2023-04-12 DIAGNOSIS — Z79.899 ENCOUNTER FOR LONG-TERM (CURRENT) USE OF HIGH-RISK MEDICATION: ICD-10-CM

## 2023-04-12 PROCEDURE — 99215 OFFICE O/P EST HI 40 MIN: CPT | Performed by: ANESTHESIOLOGY

## 2023-04-12 RX ORDER — BUPRENORPHINE AND NALOXONE 8; 2 MG/1; MG/1
FILM, SOLUBLE BUCCAL; SUBLINGUAL
Qty: 60 EACH | Refills: 5 | Status: SHIPPED | OUTPATIENT
Start: 2023-04-12 | End: 2023-10-04

## 2023-04-12 RX ORDER — BUPRENORPHINE AND NALOXONE 2; .5 MG/1; MG/1
1 FILM, SOLUBLE BUCCAL; SUBLINGUAL DAILY PRN
Qty: 30 FILM | Refills: 5 | Status: SHIPPED | OUTPATIENT
Start: 2023-04-12 | End: 2023-10-04

## 2023-04-12 ASSESSMENT — PAIN SCALES - GENERAL: PAINLEVEL: NO PAIN (1)

## 2023-04-12 NOTE — PATIENT INSTRUCTIONS
Continue working with [facility] pain clinic and decreasing your Dilaudid use.    We are leaving your Suboxone the same.  8 mg films twice daily with an extra 2 mg film to be used as needed for bad headaches.  We did discuss decreasing this over time in the future.    Follow-up in 6 months.  An appointment was made for October 4, 2023.    Antonette Gamble MD

## 2023-10-04 ENCOUNTER — LAB (OUTPATIENT)
Dept: LAB | Facility: CLINIC | Age: 58
End: 2023-10-04
Payer: COMMERCIAL

## 2023-10-04 ENCOUNTER — OFFICE VISIT (OUTPATIENT)
Dept: PALLIATIVE MEDICINE | Facility: CLINIC | Age: 58
End: 2023-10-04
Payer: COMMERCIAL

## 2023-10-04 VITALS — OXYGEN SATURATION: 99 % | SYSTOLIC BLOOD PRESSURE: 147 MMHG | DIASTOLIC BLOOD PRESSURE: 90 MMHG | HEART RATE: 76 BPM

## 2023-10-04 DIAGNOSIS — Z79.899 ENCOUNTER FOR LONG-TERM (CURRENT) USE OF HIGH-RISK MEDICATION: ICD-10-CM

## 2023-10-04 DIAGNOSIS — G43.719 INTRACTABLE CHRONIC MIGRAINE WITHOUT AURA AND WITHOUT STATUS MIGRAINOSUS: Primary | ICD-10-CM

## 2023-10-04 PROCEDURE — 99000 SPECIMEN HANDLING OFFICE-LAB: CPT

## 2023-10-04 PROCEDURE — 80307 DRUG TEST PRSMV CHEM ANLYZR: CPT | Mod: 90

## 2023-10-04 PROCEDURE — G0480 DRUG TEST DEF 1-7 CLASSES: HCPCS | Mod: 90

## 2023-10-04 PROCEDURE — G0480 DRUG TEST DEF 1-7 CLASSES: HCPCS | Mod: 59 | Performed by: INTERNAL MEDICINE

## 2023-10-04 PROCEDURE — 99215 OFFICE O/P EST HI 40 MIN: CPT | Performed by: ANESTHESIOLOGY

## 2023-10-04 RX ORDER — BUPRENORPHINE AND NALOXONE 8; 2 MG/1; MG/1
FILM, SOLUBLE BUCCAL; SUBLINGUAL
Qty: 60 EACH | Refills: 5 | Status: SHIPPED | OUTPATIENT
Start: 2023-10-04 | End: 2024-04-18

## 2023-10-04 RX ORDER — BUPRENORPHINE AND NALOXONE 2; .5 MG/1; MG/1
1 FILM, SOLUBLE BUCCAL; SUBLINGUAL DAILY PRN
Qty: 30 FILM | Refills: 5 | Status: SHIPPED | OUTPATIENT
Start: 2023-10-04 | End: 2024-04-18

## 2023-10-04 ASSESSMENT — PAIN SCALES - PAIN ENJOYMENT GENERAL ACTIVITY SCALE (PEG)
INTERFERED_ENJOYMENT_LIFE: 10 - COMPLETELY INTERFERES
AVG_PAIN_PASTWEEK: 4
PEG_TOTALSCORE: 8
INTERFERED_GENERAL_ACTIVITY: 10 - COMPLETELY INTERFERES

## 2023-10-04 ASSESSMENT — PAIN SCALES - GENERAL: PAINLEVEL: NO PAIN (1)

## 2023-10-04 NOTE — PROGRESS NOTES
Urine sample obtained and sent to [facility].     Controlled substance agreement covered, signed and patient was given a copy. Patient refused to sign until expectation #5 noted that provider is aware pt is also seen at [facility] on a monthly basis.    NICOLE signed for [facility]. NICOLE faxed and then placed in HIM Scanning.

## 2023-10-04 NOTE — LETTER
Opioid / Opioid Plus Controlled Substance Agreement    This is an agreement between you and your provider about the safe and appropriate use of controlled substance/opioids prescribed by your care team. Controlled substances are medicines that can cause physical and mental dependence (abuse).    There are strict laws about having and using these medicines. We here at [facility] are committing to working with you in your efforts to get better. To support you in this work, we ll help you schedule regular office appointments for medicine refills. If we must cancel or change your appointment for any reason, we ll make sure you have enough medicine to last until your next appointment.     As a Provider, I will:  Listen carefully to your concerns and treat you with respect.   Recommend a treatment plan that I believe is in your best interest. This plan may involve therapies other than opioid pain medication.   Talk with you often about the possible benefits, and the risk of harm of any medicine that we prescribe for you.   Provide a plan on how to taper (discontinue or go off) using this medicine if the decision is made to stop its use.    As a Patient, I understand that opioid(s):   Are a controlled substance prescribed by my care team to help me function or work and manage my condition(s).   Are strong medicines and can cause serious side effects such as:  Drowsiness, which can seriously affect my driving ability  A lower breathing rate, enough to cause death  Harm to my thinking ability   Depression   Abuse of and addiction to this medicine  Need to be taken exactly as prescribed. Combining opioids with certain medicines or chemicals (such as illegal drugs, sedatives, sleeping pills, and benzodiazepines) can be dangerous or even fatal. If I stop opioids suddenly, I may have severe withdrawal symptoms.  Do not work for all types of pain nor for all patients. If they re not helpful, I may be asked to stop  them.        The risks, benefits and side effects of these medicine(s) were explained to me. I agree that:  I will take part in other treatments as advised by my care team. This may be psychiatry or counseling, physical therapy, behavioral therapy, group treatment or a referral to a specialist.     I will keep all my appointments. I understand that this is part of the monitoring of opioids. My care team may require an office visit for EVERY opioid/controlled substance refill. If I miss appointments or don t follow instructions, my care team may stop my medicine.    I will take my medicines as prescribed. I will not change the dose or schedule unless my care team tells me to. There will be no refills if I run out early.     I may be asked to come to the clinic and complete a urine drug test or complete a pill count at any time. If I don t give a urine sample or participate in a pill count, the care team may stop my medicine.    I will only receive prescriptions from this clinic for chronic pain. If I am treated by another provider for acute pain issues, I will tell them that I am taking opioid pain medication for chronic pain and that I have a treatment agreement with this provider. I will inform my [facility] care team within one business day if I am given a prescription for any pain medication by another healthcare provider. My [facility] care team can contact other providers and pharmacists about my use of any medicines.    It is up to me to make sure that I don t run out of my medicines on weekends or holidays. If my care team is willing to refill my opioid prescription without a visit, I must request refills only during office hours. Refills may take up to 3 business days to process. I will use one pharmacy to fill all my opioid and other controlled substance prescriptions. I will notify the clinic about any changes to my insurance or medication availability.    I am responsible for my  prescriptions. If the medicine/prescription is lost, stolen or destroyed, it will not be replaced. I also agree not to share controlled substance medicines with anyone.    I am aware I should not use any illegal or recreational drugs. I agree not to drink alcohol unless my care team says I can.       If I enroll in the [place] [facility] program, I will tell my care team prior to my next refill.     I will tell my care team right away if I become pregnant, have a new medical problem treated outside of my regular clinic, or have a change in my medications.    I understand that this medicine can affect my thinking, judgment and reaction time. Alcohol and drugs affect the brain and body, which can affect the safety of my driving. Being under the influence of alcohol or drugs can affect my decision-making, behaviors, personal safety, and the safety of others. Driving while impaired (DWI) can occur if a person is driving, operating, or in physical control of a car, motorcycle, boat, snowmobile, ATV, motorbike, off-road vehicle, or any other motor vehicle ([place] Statute 169[place].20). I understand the risk if I choose to drive or operate any vehicle or machinery.    I understand that if I do not follow any of the conditions above, my prescriptions or treatment may be stopped or changed.          Opioids  What You Need to Know    What are opioids?   Opioids are pain medicines that must be prescribed by a doctor. They are also known as narcotics.     Examples are:   morphine (MS Contin, Radha)  oxycodone (Oxycontin)  oxycodone and acetaminophen (Percocet)  hydrocodone and acetaminophen (Vicodin, Norco)   fentanyl patch (Duragesic)   hydromorphone (Dilaudid)   methadone  codeine (Tylenol #3)     What do opioids do well?   Opioids are best for severe short-term pain such as after a surgery or injury. They may work well for cancer pain. They may help some people with long-lasting (chronic) pain.     What do opioids NOT do  well?   Opioids never get rid of pain entirely, and they don t work well for most patients with chronic pain. Opioids don t reduce swelling, one of the causes of pain.                                    Other ways to manage chronic pain and improve function include:     Treat the health problem that may be causing pain  Anti-inflammation medicines, which reduce swelling and tenderness, such as ibuprofen (Advil, Motrin) or naproxen (Aleve)  Acetaminophen (Tylenol)  Antidepressants and anti-seizure medicines, especially for nerve pain  Topical treatments such as patches or creams  Injections or nerve blocks  Chiropractic or osteopathic treatment  Acupuncture, massage, deep breathing, meditation, visual imagery, aromatherapy  Use heat or ice at the pain site  Physical therapy   Exercise  Stop smoking  Take part in therapy       Risks and side effects     Talk to your doctor before you start or decide to keep taking opioids. Possible side effects include:    Lowering your breathing rate enough to cause death  Overdose, including death, especially if taking higher than prescribed doses  Worse depression symptoms; less pleasure in things you usually enjoy  Feeling tired or sluggish  Slower thoughts or cloudy thinking  Being more sensitive to pain over time; pain is harder to control  Trouble sleeping or restless sleep  Changes in hormone levels (for example, less testosterone)  Changes in sex drive or ability to have sex  Constipation  Unsafe driving  Itching and sweating  Dizziness  Nausea, throwing up and dry mouth    What else should I know about opioids?    Opioids may lead to dependence, tolerance, or addiction.    Dependence means that if you stop or reduce the medicine too quickly, you will have withdrawal symptoms. These include loose poop (diarrhea), jitters, flu-like symptoms, nervousness and tremors. Dependence is not the same as addiction.                     Tolerance means needing higher doses over time to  get the same effect. This may increase the chance of serious side effects.    Addiction is when people improperly use a substance that harms their body, their mind or their relations with others. Use of opiates can cause a relapse of addiction if you have a history of drug or alcohol abuse.    People who have used opioids for a long time may have a lower quality of life, worse depression, higher levels of pain and more visits to doctors.    You can overdose on opioids. Take these steps to lower your risk of overdose:    Recognize the signs:  Signs of overdose include decrease or loss of consciousness (blackout), slowed breathing, trouble waking up and blue lips. If someone is worried about overdose, they should call 911.    Talk to your doctor about Narcan (naloxone).   If you are at risk for overdose, you may be given a prescription for Narcan. This medicine very quickly reverses the effects of opioids.   If you overdose, a friend or family member can give you Narcan while waiting for the ambulance. They need to know the signs of overdose and how to give Narcan.     Don't use alcohol or street drugs.   Taking them with opioids can cause death.    Do not take any of these medicines unless your doctor says it s OK. Taking these with opioids can cause death:  Benzodiazepines, such as lorazepam (Ativan), alprazolam (Xanax) or diazepam (Valium)  Muscle relaxers, such as cyclobenzaprine (Flexeril)  Sleeping pills like zolpidem (Ambien)   Other opioids      How to keep you and other people safe while taking opioids:    Never share your opioids with others.  Opioid medicines are regulated by the Drug Enforcement Agency ([place]). Selling or sharing medications is a criminal act.    2. Be sure to store opioids in a secure place, locked up if possible. Young children can easily swallow them and overdose.    3. When you are traveling with your medicines, keep them in the original bottles. If you use a pill box, be sure you also  carry a copy of your medicine list from your clinic or pharmacy.    4. Safe disposal of opioids    Most pharmacies have places to get rid of medicine, called disposal kiosks. Medicine disposal options are also available in every [facility]. Search your county and  medication disposal  to find more options. You can find more details at:  https://[email or web address]-green/managing-unwanted-medications     I agree that my provider, clinic care team, and pharmacy may work with any city, state or federal law enforcement agency that investigates the misuse, sale, or other diversion of my controlled medicine. I will allow my provider to discuss my care with, or share a copy of, this agreement with any other treating provider, pharmacy or emergency room where I receive care.    I have read this agreement and have asked questions about anything I did not understand.    _______________________________________________________  Patient Signature - Susan Javed _____________________                   Date     _______________________________________________________  Provider Signature - Antonette Gamble MD   _____________________                   Date     _______________________________________________________  Witness Signature (required if provider not present while patient signing)   _____________________                   Date

## 2023-10-04 NOTE — PATIENT INSTRUCTIONS
----------------------------------------------------------------  [facility] Number:  [phone]   Call with any questions about your care and for scheduling assistance.   Calls are returned Monday through Friday between 8 AM and 4:30 PM. We usually get back to you within 2 business days depending on the issue/request.    If we are prescribing your medications:  For opioid medication refills, call the clinic or send a [employer] message 7 days in advance.  Please include:  Name of requested medication  Name of the pharmacy.  For non-opioid medications, call your pharmacy directly to request a refill. Please allow 3-4 days to be processed.   Per [place] State Law:  All controlled substance prescriptions must be filled within 30 days of being written.    For those controlled substances allowing refills, pickup must occur within 30 days of last fill.      We believe regular attendance is key to your success in our program!    Any time you are unable to keep your appointment we ask that you call us at least 24 hours in advance to cancel.This will allow us to offer the appointment time to another patient.   Multiple missed appointments may lead to dismissal from the clinic.

## 2023-10-04 NOTE — PROGRESS NOTES
[facility]      Date of visit: 10/4/2023    Chief complaint:   Chief Complaint   Patient presents with    Pain       Interval history:  Susan Javed is a 58 year old female last seen by me for INITIAL CONSULT on 11/1/2022 and for FOLLOW UP on 4/12/2023 IN PERSON.       Since her last visit, Susan Javed reports:    - Doing well  - She had a spenopalatine suborbital ridge blocks on the left at [facility] and this was not helpful.  - She started Emgality about 6 months ago.  This is prescribed by [facility] and has been helpful for her headaches. .   - Getting prescribed dilaudid 2mg #12/month by [facility] where she is followed monthly. She is going down to #10/month now.   - She continues to take Modafinil 200mg one tablet once in the morning and then as needed in the afternoon.   - Primary pain complaint is migraine HA which started after her encephalitis diagnosis in 1995. Her pain is located in the left temporal area. Associated with nausea and vomitting. Usually occur after midnight. She does also have daily headaches that she takes excedrine for ( one in the morning). She typically has one really bad migraine a month with nausea. Does not use NSAIDs.   - She has been able to decrease her dilaudid to #10 4mg tablets/month.  She was previously taking #25 tablets/month.   - She is using buprenorphine 16mg daily. 4mg upon waking, 4mg lunch, 4mg dinner and 4mg PRN.  She does not use it after 4pm.    - At age 31 she had a grand mal seizure. Tried multiple anti-epileptics. Is now on tegretol and topiramate and this works very well for her seizures.   - Has been on atenolol for 20 years for headaches. Rarely uses IM ketorolac injections.  - Has tried acupuncture, posture control, biofeedback, cephaly, botox.    - Exercises every day 30-40 minutes a day on the treadmill.  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    - She states she is no  longer drinking alcohol.   - She currently lives in  with her , who comes to all her medical visits.   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       Patient reported symptoms:  Patient Supplied Answers To the [facility] Pain Questionnaire       No data to display                No images are attached to the encounter.    MN  REVIEWED TODAY: YES  DILAUDID 4MG #12 8/31/2023  SUBOXONE 8MG #60 9/23/2023  SUBOXONE 2MG #30 9/29/2023  MODAFINIL 200MG #180 7/28/2022    UDS and CONTRACT DONE on 11/1/2022 - positive for ETG, ETS and buprenorphine and this was repeated today    MEDICATIONS FOR PAIN:   SUBOXONE 8MG BID  DILAUDID 4MG PRN - prescribed by [facility]  PAMELOR 75MG at bedtime  TOPAMAX 200MG DAILY & 25MG TID (for epilepsy)  ATENOLOL 75MG BID  TORADOL IM PRN     INJECTIONS/SURGERY:  BOTOX injections in the past with Dr. Mccarthy - [place]    IMAGING:   No new     LABS:   reviewed    Medications:  Current Outpatient Medications   Medication Sig Dispense Refill    ALEVE 220 MG OR TABS 1 TABLET BID PRN with menses      ATENOLOL 25 MG OR TABS 3 tabs BID      buprenorphine HCl-naloxone HCl (SUBOXONE) 2-0.5 MG per film Place 1 Film under the tongue daily as needed (pain) 30 Film 5    buprenorphine HCl-naloxone HCl (SUBOXONE) 8-2 MG per film 4mg or 1/2 film QID for chronic migraine pain for a total of 16mg daily 60 each 5    CALCIUM + D OR       HYDROmorphone (DILAUDID) 4 MG tablet TAKE 1 TABLET (4 MG) BY MOUTH EVERY 6 HOURS AS NEEDED FOR SEVERE MIGRAINE PAIN,  MAY REPEAT EVERY 4 HOURS AS NEEDED FOR CHRONIC MIGRAINE PAIN. 50 tablet 0    ketorolac (TORADOL) 30 MG/ML injection ADMINISTER 1 ML IN THE MUSCLE EVERY 6 HOURS      LINZESS 290 MCG capsule TAKE 1 CAPSULE BY MOUTH EVERY DAY      modafinil (PROVIGIL) 200 MG tablet Take 200 mg by mouth      Multiple Vitamin (MULTIVITAMIN OR) Take 1 tablet by mouth daily.      nortriptyline (PAMELOR) 75 MG capsule take 75 mg by mouth At Bedtime.      ondansetron (ZOFRAN ODT) 8 MG ODT tab DISSOLVE 1  TABLET UNDER THE TONGUE TWICE DAILY AS NEEDED      promethazine (PHENERGAN) 25 MG suppository Place 1 suppository rectally every 6 hours as needed for nausea. 12 each 0    TEGRETOL XR## 400 MG OR TB12 1 TABLET TWICE DAILY      TOPAMAX 200 MG OR TABS 1 TABLET TWICE DAILY      TOPAMAX 25 MG OR TABS 3 tabs BID           Physical Exam:  not currently breastfeeding.    GENERAL: Healthy, alert and no distress  EYES: Eyes grossly normal to inspection.  No discharge or erythema, or obvious scleral/conjunctival abnormalities.  RESP: No audible wheeze, cough, or visible cyanosis.  No visible retractions or increased work of breathing.    SKIN: Visible skin clear. No significant rash, abnormal pigmentation or lesions.  NEURO: Cranial nerves grossly intact.  Mentation and speech appropriate for age.  PSYCH: Mentation appears normal, affect normal/bright, judgement and insight intact, normal speech and appearance well-groomed.         ASSESSMENT/PLAN:   Susan Javed is a 58 year old female has a past medical history of viral encephalitis, migraine headaches, tension headaches and mental health history significant for hypersomnia and adjustment disorder and is being seen at the pain clinic for suboxone management for her chronic pain.       1. Intractable migraine without aura and without status migrainosus  She was referred to me by Dr. Howard Mccarthy whom she had been seeing for the past 22 years at [facility]. Her initial UDS was positive for alcohol so she was taken off dilaudid and her suboxone was increased to 16mg daily.  She then established care with [facility] for her headaches (injections, emgality, medications) and they agreed to prescribe dilaudid for her, in addition to her suboxone and are helping her to taper slowly off. She continues to take this PRN for her headaches in addition to her suboxone.    She continues to use Suboxone 16mg daily AND 2mg PRN. She has reduced her dilaudid to 8-10 4mg tablets  monthly in the last 3 months and is getting this prescription from [facility] who are also managing her headaches.     She is aware that they can take over care and also prescribe her Suboxone, however she would like to continue to have me prescribe this for now.     She is not a patient of the [facility] system.  She went to a one time visit with a PCP in order to get a consult to come and see me but plans to continue with her long term PCP and [facility]. I cannot see her records from [facility].      Plan to continue Suboxone 16mg daily plus 2mg PRN.  We did discuss tapering down on this over time.      - buprenorphine HCl-naloxone HCl (SUBOXONE) 8-2 MG per film; 4mg or 1/2 film QID for chronic migraine pain for a total of 16mg daily  Dispense: 60 each; Refill: 5  - buprenorphine HCl-naloxone HCl (SUBOXONE) 2-0.5 MG per film; Place 1 Film under the tongue daily as needed (pain)  Dispense: 30 Film; Refill: 5    2. Encounter for long-term (current) use of high-risk medication  High Risk Drug Monitoring?  YES  Drug being monitored: Suboxone   Reason for drug: Opioid Use Disorder  What is being monitored?: Dosage, Cravings, Trigger, side effects, and abstinence.        MEDICATIONS:   Orders Placed This Encounter   Medications    buprenorphine HCl-naloxone HCl (SUBOXONE) 8-2 MG per film     Simg or 1/2 film QID for chronic migraine pain for a total of 16mg daily     Dispense:  60 each     Refill:  5    buprenorphine HCl-naloxone HCl (SUBOXONE) 2-0.5 MG per film     Sig: Place 1 Film under the tongue daily as needed (pain)     Dispense:  30 Film     Refill:  5       FOLLOW UP:   EVERY 6 months - an order was placed so she can schedule this via [employer].     BILLING TIME DOCUMENTATION:   The total TIME spent on this patient on the date of the encounter/appointment was 43 minutes.      TOTAL TIME includes:   Time spent preparing to see the patient (reviewing records and tests) - 4 min  Time spent face to face (or over the phone)  with the patient - 31 min  Time spent ordering tests, medications, procedures and referrals - 2 min  Time spent Referring and communicating with other healthcare professionals - 0 min  Time spent documenting clinical information in Epic - 6 min      SHAHEED TERAN MD   Pain Management

## 2023-10-05 LAB — CREAT UR-MCNC: 139 MG/DL

## 2023-10-06 LAB
BUPRENORPHINE UR CFM-MCNC: 520 NG/ML
BUPRENORPHINE/CREAT UR: 374 NG/MG {CREAT}
ETHYL GLUCURONIDE UR QL SCN: NORMAL NG/ML
NALOXONE UR CFM-MCNC: 986 NG/ML
NALOXONE: 709 NG/MG {CREAT}
NORBUPRENORPHINE UR CFM-MCNC: 1380 NG/ML
NORBUPRENORPHINE/CREAT UR: 993 NG/MG {CREAT}

## 2023-10-11 ENCOUNTER — TELEPHONE (OUTPATIENT)
Dept: PALLIATIVE MEDICINE | Facility: CLINIC | Age: 58
End: 2023-10-11
Payer: COMMERCIAL

## 2023-10-11 LAB
ETHYL GLUCURONIDE UR CFM-MCNC: NORMAL NG/ML
ETHYL SULFATE UR CFM-MCNC: NORMAL NG/ML

## 2024-04-17 NOTE — PROGRESS NOTES
[facility]      Date of visit: 4/18/2024    Chief complaint:   Chief Complaint   Patient presents with    Pain       Interval history:  Susan Javed is a 59 year old female last seen by me for INITIAL CONSULT on 11/1/2022 and for FOLLOW UP on 10/4/2023 IN PERSON.       Since her last visit, Susan Javed reports:    - Doing really well  - She is here with her  and they just returned from 6 weeks in the [facility].   - Started Prolia injections every 6 months for her osteoporosis  - She is being seen by endocrinology for low sodium. Her thyroid hormones were also off. They are doing a 24 hour test.   - She is on carbamazepine for epilepsy.  This began at age 31 with a grand mal seizure.   - She started Emgality about a year ago.  This is prescribed by [facility] and has been helpful for her headaches but mostly for her nausea/vomiting.   - Getting prescribed dilaudid 2mg #10/month by [facility] where she is followed monthly.  - She continues to take Modafinil 200mg one tablet once in the morning and then as needed in the afternoon.   - Primary pain complaint is migraine HA which started after her encephalitis diagnosis in 1995. Her pain is located in the left temporal area. Associated with nausea and vomitting. Usually occur after midnight. She does also have daily headaches that she takes excedrine for ( one in the morning). She typically has one really bad migraine a month with nausea. Does not use NSAIDs.   - She is using buprenorphine 16-18mg daily.   - Has been on atenolol for 20 years for headaches. Rarely uses IM ketorolac injections.  - Has tried acupuncture, posture control, biofeedback, cephaly, botox.    - Exercises every day 30-40 minutes a day on the treadmill.  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   - She currently lives in  with her , who comes to all her medical visits.   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       Patient reported symptoms:  Patient Supplied Answers To the [facility] Pain Questionnaire       No data to display                No images are attached to the encounter.    MN  REVIEWED TODAY: YES        UDS and CONTRACT DONE on 10/4/2023 - this was positive for alcohol again.     MEDICATIONS FOR PAIN:   SUBOXONE 8MG BID + 2mg film PRN  DILAUDID 4MG PRN - prescribed by [facility] - she is getting #10/day  PAMELOR 75MG at bedtime  TOPAMAX 200MG DAILY & 25MG TID (for epilepsy)  ATENOLOL 75MG BID  TORADOL IM PRN     EMGALITY    INJECTIONS/SURGERY:  BOTOX injections in the past with Dr. Mccarthy - [place]  spenopalatine suborbital ridge blocks on the left at [facility] - [place]    IMAGING:   No new     LABS:   reviewed    Medications:  Current Outpatient Medications   Medication Sig Dispense Refill    ALEVE 220 MG OR TABS 1 TABLET BID PRN with menses      ATENOLOL 25 MG OR TABS 3 tabs BID      buprenorphine HCl-naloxone HCl (SUBOXONE) 2-0.5 MG per film Place 1 Film under the tongue daily as needed (pain) 30 Film 5    buprenorphine HCl-naloxone HCl (SUBOXONE) 8-2 MG per film 4mg or 1/2 film QID for chronic migraine pain for a total of 16mg daily 60 each 5    CALCIUM + D OR       HYDROmorphone (DILAUDID) 4 MG tablet TAKE 1 TABLET (4 MG) BY MOUTH EVERY 6 HOURS AS NEEDED FOR SEVERE MIGRAINE PAIN,  MAY REPEAT EVERY 4 HOURS AS NEEDED FOR CHRONIC MIGRAINE PAIN. 50 tablet 0    ketorolac (TORADOL) 30 MG/ML injection ADMINISTER 1 ML IN THE MUSCLE EVERY 6 HOURS      LINZESS 290 MCG capsule TAKE 1 CAPSULE BY MOUTH EVERY DAY      modafinil (PROVIGIL) 200 MG tablet Take 200 mg by mouth      Multiple Vitamin (MULTIVITAMIN OR) Take 1 tablet by mouth daily.      nortriptyline (PAMELOR) 75 MG capsule take 75 mg by mouth At Bedtime.      ondansetron (ZOFRAN ODT) 8 MG ODT tab DISSOLVE 1 TABLET UNDER THE TONGUE TWICE  DAILY AS NEEDED      promethazine (PHENERGAN) 25 MG suppository Place 1 suppository rectally every 6 hours as needed for nausea. 12 each 0    TEGRETOL XR## 400 MG OR TB12 1 TABLET TWICE DAILY      TOPAMAX 200 MG OR TABS 1 TABLET TWICE DAILY      TOPAMAX 25 MG OR TABS 3 tabs BID           Physical Exam:  Blood pressure 133/85, pulse 72, weight 69.4 kg (153 lb 1.6 oz), SpO2 99%, not currently breastfeeding.    GENERAL: Healthy, alert and no distress  EYES: Eyes grossly normal to inspection.  No discharge or erythema, or obvious scleral/conjunctival abnormalities.  RESP: No audible wheeze, cough, or visible cyanosis.  No visible retractions or increased work of breathing.    SKIN: Visible skin clear. No significant rash, abnormal pigmentation or lesions.  NEURO: Cranial nerves grossly intact.  Mentation and speech appropriate for age.  PSYCH: Mentation appears normal, affect normal/bright, judgement and insight intact, normal speech and appearance well-groomed.         ASSESSMENT/PLAN:   Susan Javed is a 59 year old female has a past medical history of viral encephalitis, migraine headaches, tension headaches and mental health history significant for hypersomnia and adjustment disorder and is being seen at the pain clinic for suboxone management for her chronic pain.       1. Intractable migraine without aura and without status migrainosus  She was referred to me by Dr. Howard Mccarthy whom she had been seeing for the past 22 years at [facility]. Her initial UDS was positive for alcohol so she was taken off dilaudid and her suboxone was increased to 16mg daily.  She then established care with [facility] for her headaches (injections, emgality, medications) and they agreed to prescribe dilaudid for her, in addition to her suboxone. She continues to take this PRN for her headaches in addition to her suboxone.    She continues to use Suboxone 16mg daily AND 2mg PRN. She has reduced her dilaudid to #10 4mg  tablets monthly in the last 3 months and is getting this prescription from [facility] who are also managing her headaches.     She is aware that they can take over care and also prescribe her Suboxone, however she would like to continue to have me prescribe this for now.  She is not a patient of the [facility] system.         Plan to continue Suboxone 16mg daily plus 2mg PRN.  We did discuss tapering down on this over time.      - buprenorphine HCl-naloxone HCl (SUBOXONE) 8-2 MG per film; 4mg or 1/2 film QID for chronic migraine pain for a total of 16mg daily  Dispense: 60 each; Refill: 5  - buprenorphine HCl-naloxone HCl (SUBOXONE) 2-0.5 MG per film; Place 1 Film under the tongue daily as needed (pain)  Dispense: 30 Film; Refill: 5    2. Encounter for long-term (current) use of high-risk medication  High Risk Drug Monitoring?  YES  Drug being monitored: Suboxone   Reason for drug: Opioid Use Disorder  What is being monitored?: Dosage, Cravings, Trigger, side effects, and abstinence.        MEDICATIONS:   Orders Placed This Encounter   Medications    EMGALITY 120 MG/ML injection     Sig: Inject 120 mg Subcutaneous every 30 days    denosumab (PROLIA) 60 MG/ML SOSY injection     Sig: Inject 60 mg Subcutaneous every 6 months    aspirin-acetaminophen-caffeine (EXCEDRIN MIGRAINE) 250-250-65 MG tablet     Sig: Take 1 tablet by mouth every 6 hours as needed    buprenorphine HCl-naloxone HCl (SUBOXONE) 2-0.5 MG per film     Sig: Place 1 Film under the tongue daily as needed (pain)     Dispense:  30 Film     Refill:  5    buprenorphine HCl-naloxone HCl (SUBOXONE) 8-2 MG per film     Simg or 1/2 film QID for chronic migraine pain for a total of 16mg daily     Dispense:  60 each     Refill:  5       FOLLOW UP:   EVERY 6 months - OCT 17th @ 11AM IN PERSON - an order was placed so she can schedule this via [employer].       BILLING TIME DOCUMENTATION:   The total TIME spent on this patient on the date of the encounter/appointment was  41 minutes.      TOTAL TIME includes:   Time spent preparing to see the patient (reviewing records and tests) - 4 min  Time spent face to face (or video/phone) with the patient for follow up - 26 min  Time spent ordering tests, medications, procedures and referrals - 2 min  Time spent Referring and communicating with other healthcare professionals - 0 min  Time spent documenting clinical information in Epic - 9 min        SHAHEED TERAN MD   Pain Management

## 2024-04-18 ENCOUNTER — OFFICE VISIT (OUTPATIENT)
Dept: PALLIATIVE MEDICINE | Facility: CLINIC | Age: 59
End: 2024-04-18
Payer: COMMERCIAL

## 2024-04-18 VITALS
BODY MASS INDEX: 24.71 KG/M2 | SYSTOLIC BLOOD PRESSURE: 133 MMHG | WEIGHT: 153.1 LBS | OXYGEN SATURATION: 99 % | HEART RATE: 72 BPM | DIASTOLIC BLOOD PRESSURE: 85 MMHG

## 2024-04-18 DIAGNOSIS — Z79.899 ENCOUNTER FOR LONG-TERM (CURRENT) USE OF HIGH-RISK MEDICATION: ICD-10-CM

## 2024-04-18 DIAGNOSIS — G43.719 INTRACTABLE CHRONIC MIGRAINE WITHOUT AURA AND WITHOUT STATUS MIGRAINOSUS: Primary | ICD-10-CM

## 2024-04-18 PROCEDURE — 99215 OFFICE O/P EST HI 40 MIN: CPT | Performed by: ANESTHESIOLOGY

## 2024-04-18 PROCEDURE — G2211 COMPLEX E/M VISIT ADD ON: HCPCS | Performed by: ANESTHESIOLOGY

## 2024-04-18 RX ORDER — BUPRENORPHINE AND NALOXONE 2; .5 MG/1; MG/1
1 FILM, SOLUBLE BUCCAL; SUBLINGUAL DAILY PRN
Qty: 30 FILM | Refills: 5 | Status: SHIPPED | OUTPATIENT
Start: 2024-04-18

## 2024-04-18 RX ORDER — BUPRENORPHINE AND NALOXONE 8; 2 MG/1; MG/1
FILM, SOLUBLE BUCCAL; SUBLINGUAL
Qty: 60 EACH | Refills: 5 | Status: SHIPPED | OUTPATIENT
Start: 2024-04-18

## 2024-04-18 RX ORDER — GALCANEZUMAB 120 MG/ML
120 INJECTION, SOLUTION SUBCUTANEOUS
COMMUNITY
Start: 2023-09-21

## 2024-04-18 ASSESSMENT — PAIN SCALES - PAIN ENJOYMENT GENERAL ACTIVITY SCALE (PEG)
INTERFERED_ENJOYMENT_LIFE: 5
AVG_PAIN_PASTWEEK: 2
INTERFERED_GENERAL_ACTIVITY: 4
AVG_PAIN_PASTWEEK: 2
INTERFERED_GENERAL_ACTIVITY: 4
INTERFERED_ENJOYMENT_LIFE: 5
PEG_TOTALSCORE: 3.67
PEG_TOTALSCORE: 3.67

## 2024-04-18 ASSESSMENT — PAIN SCALES - GENERAL: PAINLEVEL: MILD PAIN (2)

## 2024-06-02 ENCOUNTER — HEALTH MAINTENANCE LETTER (OUTPATIENT)
Age: 59
End: 2024-06-02

## 2024-10-16 NOTE — PROGRESS NOTES
[facility]      Date of visit: 10/17/2024    Chief complaint:   Chief Complaint   Patient presents with    Pain       Interval history:  Susan Javed is a 59 year old female last seen by me for INITIAL CONSULT on 11/1/2022 and for FOLLOW UP on 4/18/2024 IN PERSON.       Since her last visit, Susan Javed reports:    - Doing well   - She continues Prolia injections every 6 months for her osteoporosis.   - She is taking Suboxone 8mg film in the am and then 4mg in the afternoon.  She is using #2 of the 2mg films which is a lot to put in her mouth at once.   - She continues Dilaudid 4mg #10/month PRN  - She is going on cruise at the beginning of next month.  Taking off from [facility].  Transatlantic 17 day trip stopping in a few spots in the [place]. They want to be out of the country for the election season.   - Endocrinology is not concerned with her low sodium.  She has been diagnosed with hypothyroidism and is now taking synthroid and she is feeling a little better.   - They are going to [facility] in January for 3 months. They go there every year.  They are going to need to have someone mail them the prescription for suboxone while they are there.   - She is on carbamazepine for epilepsy.  This began at age 31 with a grand mal seizure.   - She continues with Emgality.  This is prescribed by [facility] and has been helpful for her headaches but mostly for her nausea/vomiting.   - Getting prescribed dilaudid 2mg #10/month by [facility].  - She continues to take Modafinil 200mg one tablet once in the morning and then as needed in the afternoon.   - Primary pain complaint is migraine HA which started after her encephalitis diagnosis in 1995. Her pain is located in the left temporal area. Associated with nausea and vomitting. Usually occur after midnight. She does also have daily headaches that she takes excedrine for ( one in the morning). She typically has one  really bad migraine a month with nausea. Does not use NSAIDs.   - Has been on atenolol for 20 years for headaches.   - She no longer uses IM ketorolac injections.  - Has tried acupuncture, posture control, biofeedback, cephaly, botox.    - Exercises every day 30-40 minutes a day on the treadmill.  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   - She currently lives in  with her , who comes to all her medical visits.   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       Patient reported symptoms:  Patient Supplied Answers To the  Pain Questionnaire       No data to display                No images are attached to the encounter.    [place]  REVIEWED TODAY: YES        UDS and CONTRACT DONE on 10/4/2023 - this was positive for alcohol again.     MEDICATIONS FOR PAIN:   SUBOXONE 8MG in he am + 4mg film in the afternoon (plus 4mg PRN)   DILAUDID 4MG PRN - prescribed by [facility] - she is getting #10/day  PAMELOR 75MG at bedtime  TOPAMAX 200MG DAILY & 25MG TID (for epilepsy)  ATENOLOL 75MG BID  TORADOL IM PRN     EMGALITY    INJECTIONS/SURGERY:  BOTOX injections in the past with Dr. Mccarthy - [place]  spenopalatine suborbital ridge blocks on the left at [facility]    IMAGING:   No new     LABS:   reviewed    Medications:  Current Outpatient Medications   Medication Sig Dispense Refill    ALEVE 220 MG OR TABS 1 TABLET BID PRN with menses      aspirin-acetaminophen-caffeine (EXCEDRIN MIGRAINE) 250-250-65 MG tablet Take 1 tablet by mouth every 6 hours as needed      ATENOLOL 25 MG OR TABS 3 tabs BID      buprenorphine HCl-naloxone HCl (SUBOXONE) 2-0.5 MG per film Place 1 Film under the tongue daily as needed (pain) 30 Film 5    buprenorphine HCl-naloxone HCl (SUBOXONE) 8-2 MG per film 4mg or 1/2 film QID for chronic migraine pain for a total of 16mg daily 60 each 5    CALCIUM + D OR       denosumab (PROLIA)  60 MG/ML SOSY injection Inject 60 mg Subcutaneous every 6 months      EMGALITY 120 MG/ML injection Inject 120 mg Subcutaneous every 30 days      HYDROmorphone (DILAUDID) 4 MG tablet TAKE 1 TABLET (4 MG) BY MOUTH EVERY 6 HOURS AS NEEDED FOR SEVERE MIGRAINE PAIN,  MAY REPEAT EVERY 4 HOURS AS NEEDED FOR CHRONIC MIGRAINE PAIN. 50 tablet 0    ketorolac (TORADOL) 30 MG/ML injection ADMINISTER 1 ML IN THE MUSCLE EVERY 6 HOURS      LINZESS 290 MCG capsule TAKE 1 CAPSULE BY MOUTH EVERY DAY      modafinil (PROVIGIL) 200 MG tablet Take 200 mg by mouth      Multiple Vitamin (MULTIVITAMIN OR) Take 1 tablet by mouth daily.      nortriptyline (PAMELOR) 75 MG capsule take 75 mg by mouth At Bedtime.      ondansetron (ZOFRAN ODT) 8 MG ODT tab DISSOLVE 1 TABLET UNDER THE TONGUE TWICE DAILY AS NEEDED      promethazine (PHENERGAN) 25 MG suppository Place 1 suppository rectally every 6 hours as needed for nausea. 12 each 0    TEGRETOL XR## 400 MG OR TB12 1 TABLET TWICE DAILY      TOPAMAX 200 MG OR TABS 1 TABLET TWICE DAILY      TOPAMAX 25 MG OR TABS 3 tabs BID           Physical Exam:  Blood pressure (!) 157/98, pulse 76, SpO2 98%, not currently breastfeeding.    GENERAL: Healthy, alert and no distress  EYES: Eyes grossly normal to inspection.  No discharge or erythema, or obvious scleral/conjunctival abnormalities.  RESP: No audible wheeze, cough, or visible cyanosis.  No visible retractions or increased work of breathing.    SKIN: Visible skin clear. No significant rash, abnormal pigmentation or lesions.  NEURO: Cranial nerves grossly intact.  Mentation and speech appropriate for age.  PSYCH: Mentation appears normal, affect normal/bright, judgement and insight intact, normal speech and appearance well-groomed.         ASSESSMENT/PLAN:   Susan Javed is a 59 year old female has a past medical history of viral encephalitis, migraine headaches, tension headaches and mental health history significant for hypersomnia and adjustment  disorder and is being seen at the pain clinic for suboxone management for her chronic pain.       1. Intractable chronic migraine without aura and without status migrainosus  She was referred to me by Dr. Howard Mccarthy whom she had been seeing for the past 22 years at [facility]. Her initial UDS was positive for alcohol so she was taken off dilaudid and transitioned to suboxone which went very well.  She then established care with [facility] for her headaches (injections, emgality, medications) and they agreed to prescribe dilaudid for her, in addition to her suboxone. She continues to take this PRN for her headaches in addition to her suboxone.    She continues to use Suboxone 8mg daily AND 8mg PRN. She has reduced her dilaudid to #10 4mg tablets monthly in the last 3 months and is getting this prescription from [facility] who are also managing her headaches.     She is aware that they can take over care and also prescribe her Suboxone, however she would like to continue to have me prescribe this for now.  She is not a patient of the [employer] system.      Plan to continue Suboxone 8mg daily plus 8mg PRN.  We did discuss tapering down on this over time.     - buprenorphine HCl-naloxone HCl (SUBOXONE) 8-2 MG per film; 4mg or 1/2 film QID for chronic migraine pain for a total of 16mg daily  Dispense: 60 each; Refill: 5    2. Encounter for long-term (current) use of high-risk medication  High Risk Drug Monitoring?  YES  Drug being monitored: Suboxone   Reason for drug: Opioid Use Disorder  What is being monitored?: Dosage, Cravings, Trigger, side effects, and abstinence.    - Drug Confirmation Panel Urine with Creatinine; Future      MEDICATIONS:   Orders Placed This Encounter   Medications    levothyroxine (SYNTHROID/LEVOTHROID) 88 MCG tablet     Sig: Take 88 mcg by mouth daily.    buprenorphine HCl-naloxone HCl (SUBOXONE) 8-2 MG per film     Simg or 1/2 film QID for chronic migraine pain for a total of 16mg  daily     Dispense:  60 each     Refill:  5       FOLLOW UP:   EVERY 6 months - April 17th, 2025 @ 11AM VIDEO VISIT       BILLING TIME DOCUMENTATION:   The total TIME spent on this patient on the date of the encounter/appointment was 43 minutes.      TOTAL TIME includes:   Time spent preparing to see the patient (reviewing records and tests) - 4 min  Time spent face to face (or video/phone) with the patient for follow up - 29 min  Time spent ordering tests, medications, procedures and referrals - 2 min  Time spent Referring and communicating with other healthcare professionals - 0 min  Time spent documenting clinical information in Epic - 8 min      SHAHEED TERAN MD   Pain Management

## 2024-10-17 ENCOUNTER — OFFICE VISIT (OUTPATIENT)
Dept: PALLIATIVE MEDICINE | Facility: CLINIC | Age: 59
End: 2024-10-17
Payer: COMMERCIAL

## 2024-10-17 VITALS — HEART RATE: 76 BPM | OXYGEN SATURATION: 98 % | DIASTOLIC BLOOD PRESSURE: 98 MMHG | SYSTOLIC BLOOD PRESSURE: 157 MMHG

## 2024-10-17 DIAGNOSIS — G43.719 INTRACTABLE CHRONIC MIGRAINE WITHOUT AURA AND WITHOUT STATUS MIGRAINOSUS: Primary | ICD-10-CM

## 2024-10-17 DIAGNOSIS — Z79.899 ENCOUNTER FOR LONG-TERM (CURRENT) USE OF HIGH-RISK MEDICATION: ICD-10-CM

## 2024-10-17 PROCEDURE — 99215 OFFICE O/P EST HI 40 MIN: CPT | Performed by: ANESTHESIOLOGY

## 2024-10-17 PROCEDURE — G2211 COMPLEX E/M VISIT ADD ON: HCPCS | Performed by: ANESTHESIOLOGY

## 2024-10-17 RX ORDER — LEVOTHYROXINE SODIUM 88 UG/1
88 TABLET ORAL DAILY
COMMUNITY
Start: 2024-08-02 | End: 2025-08-02

## 2024-10-17 RX ORDER — BUPRENORPHINE AND NALOXONE 8; 2 MG/1; MG/1
FILM, SOLUBLE BUCCAL; SUBLINGUAL
Qty: 60 EACH | Refills: 5 | Status: SHIPPED | OUTPATIENT
Start: 2024-10-17

## 2024-10-17 ASSESSMENT — PAIN SCALES - PAIN ENJOYMENT GENERAL ACTIVITY SCALE (PEG)
INTERFERED_GENERAL_ACTIVITY: 3
INTERFERED_GENERAL_ACTIVITY: 3
AVG_PAIN_PASTWEEK: 2
INTERFERED_ENJOYMENT_LIFE: 3
PEG_TOTALSCORE: 2.67
PEG_TOTALSCORE: 2.67
AVG_PAIN_PASTWEEK: 2
INTERFERED_ENJOYMENT_LIFE: 3

## 2024-10-17 ASSESSMENT — PAIN SCALES - GENERAL: PAINLEVEL: MILD PAIN (2)

## 2024-10-17 NOTE — PATIENT INSTRUCTIONS
We are getting rid of the 2mg films.  Recommend you take 8mg in the morning and a half a 8mg film or 4mg PRN in the afternoon.   Follow up scheduled for April 17th @ 11am - this will be a video visit unless you want to come in, in which case you can!   Antonette Gamble MD     ----------------------------------------------------------------  Clinic Number:  [phone]   Call with any questions about your care and for scheduling assistance.   Calls are returned Monday through Friday between 8 AM and 4:30 PM. We usually get back to you within 2 business days depending on the issue/request.    If we are prescribing your medications:  For opioid medication refills, call the clinic or send a [employer] message 7 days in advance.  Please include:  Name of requested medication  Name of the pharmacy.  For non-opioid medications, call your pharmacy directly to request a refill. Please allow 3-4 days to be processed.   Per [place] State Law:  All controlled substance prescriptions must be filled within 30 days of being written.    For those controlled substances allowing refills, pickup must occur within 30 days of last fill.      We believe regular attendance is key to your success in our program!    Any time you are unable to keep your appointment we ask that you call us at least 24 hours in advance to cancel.This will allow us to offer the appointment time to another patient.   Multiple missed appointments may lead to dismissal from the clinic.

## 2024-10-21 ENCOUNTER — TELEPHONE (OUTPATIENT)
Dept: PALLIATIVE MEDICINE | Facility: CLINIC | Age: 59
End: 2024-10-21
Payer: COMMERCIAL

## 2024-10-21 NOTE — TELEPHONE ENCOUNTER
UDS was collected 10/17/24 , was not sent to lab.    Routing to Nursing Team to review.        Loli Rao MA  [facility] [place]

## 2024-10-22 NOTE — TELEPHONE ENCOUNTER
Discussed with provider. Ok to complete at follow up.    Yashira TUBBS, RN Care Coordinator  [facility]  Pain [facility]

## 2024-12-27 ENCOUNTER — TRANSFERRED RECORDS (OUTPATIENT)
Dept: HEALTH INFORMATION MANAGEMENT | Facility: CLINIC | Age: 59
End: 2024-12-27
Payer: COMMERCIAL

## 2025-04-16 NOTE — PROGRESS NOTES
"[facility]      Date of visit: 4/17/2025    Chief complaint:   Chief Complaint   Patient presents with    Pain       Interval history:  Susan Javed is a 60 year old female last seen by me for INITIAL CONSULT on 11/1/2022 and for FOLLOW UP on 10/17/2024 IN PERSON.       Since her last visit, Susan Javed reports:    - Doing well   - Went to [facility] for 12 weeks. 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 They have a system for getting their suboxone while they are down there.  His brother picks it up at the pharmacy and mails it to them.   - She is using 1/2 film in the am and a whole film at night.  She has another 1/2 to use PRN but rarely uses this, therefore they have extra films which is nice for vacations.    - She continues to use Prolia injections for her osteoporosis. These are every 6 months.   - She continues Dilaudid 4mg #10/month PRN. This is prescribed by [facility]  - They enjoy cruises. And recently went on a transatlantic 17 day trip stopping in a few spots in the [facility]. They have another one planned.   - She is on carbamazepine for epilepsy.  This began at age 31 with a grand mal seizure.   - She continues with Emgality.  This is prescribed by [facility] and has been helpful for her headaches. She does not like the autoinjector.   - Getting prescribed dilaudid 2mg #10/month by [facility].  - She continues to take Modafinil 200mg one tablet once in the morning and then as needed in the afternoon.   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  She did 6 solo jumps and then \"got scared\".   - Primary pain complaint is migraine HA which started after her encephalitis diagnosis in 1995. Her pain is located in the left temporal area. Associated with nausea and vomitting. Usually occur after midnight. She does also have daily headaches that she takes excedrine for ( one in the morning). She typically has one really bad migraine a month with nausea. " Does not use NSAIDs.   - Has been on atenolol for 20 years.   - She no longer uses IM ketorolac injections.  - Has tried acupuncture, posture control, biofeedback, cephaly, botox.    - Exercises every day 30-40 minutes a day on the treadmill.  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   - She currently lives in  with her , who comes to all her medical visits.   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       Patient reported symptoms:  Patient Supplied Answers To the [facility] Pain Questionnaire       No data to display                No images are attached to the encounter.    [place]  REVIEWED TODAY:         UDS DONE on 10/4/2023 and this was repeated today    MEDICATIONS FOR PAIN:   SUBOXONE 8MG in he am + 4mg film in the afternoon (plus 4mg PRN)   DILAUDID 4MG PRN - prescribed by [facility] - she is getting #10/day  PAMELOR 75MG at bedtime  TOPAMAX 200MG DAILY & 25MG TID (for epilepsy)  ATENOLOL 75MG BID  TORADOL IM PRN     EMGALITY    INJECTIONS/SURGERY:  BOTOX injections in the past with Dr. Mccarthy - [place]  spenopalatine suborbital ridge blocks on the left at [facility]    IMAGING:   No new     LABS:   reviewed    Medications:  Current Outpatient Medications   Medication Sig Dispense Refill    ALEVE 220 MG OR TABS 1 TABLET BID PRN with menses      aspirin-acetaminophen-caffeine (EXCEDRIN MIGRAINE) 250-250-65 MG tablet Take 1 tablet by mouth every 6 hours as needed      ATENOLOL 25 MG OR TABS 3 tabs BID      buprenorphine HCl-naloxone HCl (SUBOXONE) 8-2 MG per film 4mg or 1/2 film QID for chronic migraine pain for a total of 16mg daily 60 each 5    CALCIUM + D OR       denosumab (PROLIA) 60 MG/ML SOSY injection Inject 60 mg Subcutaneous every 6 months      EMGALITY 120 MG/ML injection Inject 120 mg Subcutaneous every 30 days      HYDROmorphone (DILAUDID) 4 MG tablet TAKE 1 TABLET (4 MG) BY  MOUTH EVERY 6 HOURS AS NEEDED FOR SEVERE MIGRAINE PAIN,  MAY REPEAT EVERY 4 HOURS AS NEEDED FOR CHRONIC MIGRAINE PAIN. 50 tablet 0    ketorolac (TORADOL) 30 MG/ML injection ADMINISTER 1 ML IN THE MUSCLE EVERY 6 HOURS      levothyroxine (SYNTHROID/LEVOTHROID) 88 MCG tablet Take 88 mcg by mouth daily.      LINZESS 290 MCG capsule TAKE 1 CAPSULE BY MOUTH EVERY DAY      modafinil (PROVIGIL) 200 MG tablet Take 200 mg by mouth      Multiple Vitamin (MULTIVITAMIN OR) Take 1 tablet by mouth daily.      nortriptyline (PAMELOR) 75 MG capsule take 75 mg by mouth At Bedtime.      ondansetron (ZOFRAN ODT) 8 MG ODT tab DISSOLVE 1 TABLET UNDER THE TONGUE TWICE DAILY AS NEEDED      promethazine (PHENERGAN) 25 MG suppository Place 1 suppository rectally every 6 hours as needed for nausea. 12 each 0    TEGRETOL XR## 400 MG OR TB12 1 TABLET TWICE DAILY      TOPAMAX 200 MG OR TABS 1 TABLET TWICE DAILY      TOPAMAX 25 MG OR TABS 3 tabs BID           Physical Exam:  not currently breastfeeding.    GENERAL: Healthy, alert and no distress  EYES: Eyes grossly normal to inspection.  No discharge or erythema, or obvious scleral/conjunctival abnormalities.  RESP: No audible wheeze, cough, or visible cyanosis.  No visible retractions or increased work of breathing.    SKIN: Visible skin clear. No significant rash, abnormal pigmentation or lesions.  NEURO: Cranial nerves grossly intact.  Mentation and speech appropriate for age.  PSYCH: Mentation appears normal, affect normal/bright, judgement and insight intact, normal speech and appearance well-groomed.         ASSESSMENT/PLAN:   Susan Javed is a 60 year old female has a past medical history of viral encephalitis, migraine headaches, tension headaches and mental health history significant for hypersomnia and adjustment disorder and is being seen at the pain clinic for suboxone management for her chronic pain.       1. Intractable chronic migraine without aura and without status  migrainosus  She was referred to me by Dr. Howard Mccarthy whom she had been seeing for the past 22 years at [facility]. Her initial UDS was positive for alcohol so she was taken off dilaudid and transitioned to suboxone which went very well.  She then established care with [facility] for her headaches (injections, emgality, medications) and they agreed to prescribe dilaudid for her, in addition to her suboxone. She continues to take this PRN for her headaches in addition to her suboxone.    She continues to use Suboxone 8mg daily AND 8mg PRN. She has reduced her dilaudid to #10 4mg tablets monthly in the last 3 months and is getting this prescription from [facility] who are also managing her headaches.     She is aware that they can take over care and also prescribe her Suboxone, however she would like to continue to have me prescribe this for now.  She is not a patient of the [employer] system.      Plan to continue Suboxone 12mg daily plus 4mg PRN.     - buprenorphine HCl-naloxone HCl (SUBOXONE) 8-2 MG per film; 4mg or 1/2 film QID for chronic migraine pain for a total of 16mg daily  Dispense: 60 each; Refill: 5    2. Encounter for long-term (current) use of high-risk medication  High Risk Drug Monitoring?  YES  Drug being monitored: Suboxone   Reason for drug: Opioid Use Disorder  What is being monitored?: Dosage, Cravings, Trigger, side effects, and abstinence.    - Drug Confirmation Panel Urine with Creatinine; Future      MEDICATIONS:   Orders Placed This Encounter   Medications    buprenorphine HCl-naloxone HCl (SUBOXONE) 8-2 MG per film     Simg or 1/2 film QID for chronic migraine pain for a total of 16mg daily     Dispense:  60 each     Refill:  5       FOLLOW UP:   EVERY 6 months - OCT 16th, 2025 @ 11AM VIDEO VISIT       BILLING TIME DOCUMENTATION:   The total TIME spent on this patient on the date of the encounter/appointment was 38 minutes.      TOTAL TIME includes:   Time spent preparing to see the  patient (reviewing records and tests) - 2 min  Time spent face to face (or video/phone) with the patient for follow up - 31 min  Time spent ordering tests, medications, procedures and referrals - 1 min  Time spent Referring and communicating with other healthcare professionals - 0 min  Time spent documenting clinical information in Epic - 4 min      SHAHEED TERAN MD   Pain Management

## 2025-04-17 ENCOUNTER — OFFICE VISIT (OUTPATIENT)
Dept: PALLIATIVE MEDICINE | Facility: CLINIC | Age: 60
End: 2025-04-17
Payer: COMMERCIAL

## 2025-04-17 ENCOUNTER — LAB (OUTPATIENT)
Dept: LAB | Facility: CLINIC | Age: 60
End: 2025-04-17
Payer: COMMERCIAL

## 2025-04-17 VITALS — OXYGEN SATURATION: 100 % | DIASTOLIC BLOOD PRESSURE: 90 MMHG | HEART RATE: 70 BPM | SYSTOLIC BLOOD PRESSURE: 148 MMHG

## 2025-04-17 DIAGNOSIS — Z79.899 ENCOUNTER FOR LONG-TERM (CURRENT) USE OF HIGH-RISK MEDICATION: ICD-10-CM

## 2025-04-17 DIAGNOSIS — G43.719 INTRACTABLE CHRONIC MIGRAINE WITHOUT AURA AND WITHOUT STATUS MIGRAINOSUS: Primary | ICD-10-CM

## 2025-04-17 LAB
AMPHETAMINES UR QL: NOT DETECTED
BARBITURATES UR QL SCN: NOT DETECTED
BENZODIAZ UR QL SCN: NOT DETECTED
BUPRENORPHINE UR QL: DETECTED
CANNABINOIDS UR QL: NOT DETECTED
COCAINE UR QL SCN: NOT DETECTED
D-METHAMPHET UR QL: NOT DETECTED
METHADONE UR QL SCN: NOT DETECTED
OPIATES UR QL SCN: DETECTED
OXYCODONE UR QL SCN: NOT DETECTED
PCP UR QL SCN: NOT DETECTED
TRICYCLICS UR QL SCN: DETECTED

## 2025-04-17 RX ORDER — BUPRENORPHINE AND NALOXONE 8; 2 MG/1; MG/1
FILM, SOLUBLE BUCCAL; SUBLINGUAL
Qty: 60 EACH | Refills: 5 | Status: SHIPPED | OUTPATIENT
Start: 2025-04-17

## 2025-04-17 ASSESSMENT — PAIN SCALES - GENERAL: PAINLEVEL_OUTOF10: MILD PAIN (1)

## 2025-04-17 NOTE — NURSING NOTE
Obtained urine specimen.  Specimen sent to the lab.        Loli Rao MA  [facility]

## 2025-06-15 ENCOUNTER — HEALTH MAINTENANCE LETTER (OUTPATIENT)
Age: 60
End: 2025-06-15

## 2025-08-24 ENCOUNTER — ANESTHESIA EVENT (OUTPATIENT)
Dept: SURGERY | Facility: CLINIC | Age: 60
End: 2025-08-24
Payer: COMMERCIAL

## 2025-08-25 ENCOUNTER — ANESTHESIA (OUTPATIENT)
Dept: SURGERY | Facility: CLINIC | Age: 60
End: 2025-08-25
Payer: COMMERCIAL

## 2025-08-25 ENCOUNTER — APPOINTMENT (OUTPATIENT)
Dept: GENERAL RADIOLOGY | Facility: CLINIC | Age: 60
DRG: 988 | End: 2025-08-25
Attending: INTERNAL MEDICINE
Payer: COMMERCIAL

## 2025-08-25 ENCOUNTER — APPOINTMENT (OUTPATIENT)
Dept: CARDIOLOGY | Facility: CLINIC | Age: 60
DRG: 988 | End: 2025-08-25
Attending: INTERNAL MEDICINE
Payer: COMMERCIAL

## 2025-08-25 ENCOUNTER — HOSPITAL ENCOUNTER (INPATIENT)
Facility: CLINIC | Age: 60
LOS: 3 days | Discharge: HOME | End: 2025-08-28
Attending: OBSTETRICS & GYNECOLOGY | Admitting: INTERNAL MEDICINE
Payer: COMMERCIAL

## 2025-08-25 PROBLEM — Z98.890 POST-OPERATIVE STATE: Status: ACTIVE | Noted: 2025-08-25

## 2025-08-25 PROBLEM — I46.9 CARDIAC ARREST (H): Status: ACTIVE | Noted: 2025-08-25

## 2025-08-25 PROCEDURE — 250N000011 HC RX IP 250 OP 636: Performed by: NURSE ANESTHETIST, CERTIFIED REGISTERED

## 2025-08-25 PROCEDURE — 258N000003 HC RX IP 258 OP 636: Performed by: NURSE ANESTHETIST, CERTIFIED REGISTERED

## 2025-08-25 PROCEDURE — 250N000009 HC RX 250: Performed by: NURSE ANESTHETIST, CERTIFIED REGISTERED

## 2025-08-25 PROCEDURE — 999N000065 XR CHEST PORT 1 VIEW

## 2025-08-25 PROCEDURE — 250N000013 HC RX MED GY IP 250 OP 250 PS 637: Performed by: NURSE ANESTHETIST, CERTIFIED REGISTERED

## 2025-08-25 PROCEDURE — 93306 TTE W/DOPPLER COMPLETE: CPT | Mod: 26 | Performed by: INTERNAL MEDICINE

## 2025-08-25 PROCEDURE — 250N000011 HC RX IP 250 OP 636: Performed by: OBSTETRICS & GYNECOLOGY

## 2025-08-25 PROCEDURE — C8929 TTE W OR WO FOL WCON,DOPPLER: HCPCS

## 2025-08-25 PROCEDURE — 999N000065 XR ABDOMEN PORT 1 VIEW

## 2025-08-25 RX ORDER — EPINEPHRINE 0.1 MG/ML
INJECTION INTRAVENOUS PRN
Status: DISCONTINUED | OUTPATIENT
Start: 2025-08-25 | End: 2025-08-25

## 2025-08-25 RX ORDER — ESMOLOL HYDROCHLORIDE 10 MG/ML
INJECTION INTRAVENOUS PRN
Status: DISCONTINUED | OUTPATIENT
Start: 2025-08-25 | End: 2025-08-25

## 2025-08-25 RX ORDER — LIDOCAINE HYDROCHLORIDE 20 MG/ML
INJECTION, SOLUTION INFILTRATION; PERINEURAL PRN
Status: DISCONTINUED | OUTPATIENT
Start: 2025-08-25 | End: 2025-08-25

## 2025-08-25 RX ORDER — ALBUTEROL SULFATE 90 UG/1
INHALANT RESPIRATORY (INHALATION) PRN
Status: DISCONTINUED | OUTPATIENT
Start: 2025-08-25 | End: 2025-08-25

## 2025-08-25 RX ORDER — PROPOFOL 10 MG/ML
INJECTION, EMULSION INTRAVENOUS PRN
Status: DISCONTINUED | OUTPATIENT
Start: 2025-08-25 | End: 2025-08-25

## 2025-08-25 RX ORDER — DEXAMETHASONE SODIUM PHOSPHATE 4 MG/ML
INJECTION, SOLUTION INTRA-ARTICULAR; INTRALESIONAL; INTRAMUSCULAR; INTRAVENOUS; SOFT TISSUE PRN
Status: DISCONTINUED | OUTPATIENT
Start: 2025-08-25 | End: 2025-08-25

## 2025-08-25 RX ORDER — SODIUM CHLORIDE, SODIUM LACTATE, POTASSIUM CHLORIDE, CALCIUM CHLORIDE 600; 310; 30; 20 MG/100ML; MG/100ML; MG/100ML; MG/100ML
INJECTION, SOLUTION INTRAVENOUS CONTINUOUS PRN
Status: DISCONTINUED | OUTPATIENT
Start: 2025-08-25 | End: 2025-08-25

## 2025-08-25 RX ORDER — KETAMINE HYDROCHLORIDE 10 MG/ML
INJECTION INTRAMUSCULAR; INTRAVENOUS PRN
Status: DISCONTINUED | OUTPATIENT
Start: 2025-08-25 | End: 2025-08-25

## 2025-08-25 RX ORDER — FENTANYL CITRATE 50 UG/ML
INJECTION, SOLUTION INTRAMUSCULAR; INTRAVENOUS PRN
Status: DISCONTINUED | OUTPATIENT
Start: 2025-08-25 | End: 2025-08-25

## 2025-08-25 RX ADMIN — ALBUTEROL SULFATE 8 PUFF: 108 INHALANT RESPIRATORY (INHALATION) at 08:43

## 2025-08-25 RX ADMIN — PHENYLEPHRINE HYDROCHLORIDE 200 MCG: 10 INJECTION INTRAVENOUS at 08:55

## 2025-08-25 RX ADMIN — ROCURONIUM BROMIDE 10 MG: 50 INJECTION, SOLUTION INTRAVENOUS at 09:22

## 2025-08-25 RX ADMIN — DEXMEDETOMIDINE HYDROCHLORIDE 8 MCG: 100 INJECTION, SOLUTION INTRAVENOUS at 08:17

## 2025-08-25 RX ADMIN — EPINEPHRINE 1 MG: 0.1 INJECTION INTRACARDIAC; INTRAVENOUS at 08:22

## 2025-08-25 RX ADMIN — LIDOCAINE HYDROCHLORIDE 50 MG: 20 INJECTION, SOLUTION INFILTRATION; PERINEURAL at 07:57

## 2025-08-25 RX ADMIN — PHENYLEPHRINE HYDROCHLORIDE 200 MCG: 10 INJECTION INTRAVENOUS at 08:42

## 2025-08-25 RX ADMIN — MIDAZOLAM 2 MG: 1 INJECTION INTRAMUSCULAR; INTRAVENOUS at 07:52

## 2025-08-25 RX ADMIN — HYDROMORPHONE HYDROCHLORIDE 1 MG: 1 INJECTION, SOLUTION INTRAMUSCULAR; INTRAVENOUS; SUBCUTANEOUS at 08:11

## 2025-08-25 RX ADMIN — MIDAZOLAM 2 MG: 1 INJECTION INTRAMUSCULAR; INTRAVENOUS at 08:40

## 2025-08-25 RX ADMIN — PHENYLEPHRINE HYDROCHLORIDE 100 MCG: 10 INJECTION INTRAVENOUS at 09:09

## 2025-08-25 RX ADMIN — PHENYLEPHRINE HYDROCHLORIDE 100 MCG: 10 INJECTION INTRAVENOUS at 09:02

## 2025-08-25 RX ADMIN — ESMOLOL HYDROCHLORIDE 10 MG: 10 INJECTION, SOLUTION INTRAVENOUS at 08:34

## 2025-08-25 RX ADMIN — PHENYLEPHRINE HYDROCHLORIDE 200 MCG: 10 INJECTION INTRAVENOUS at 08:58

## 2025-08-25 RX ADMIN — Medication 20 MG: at 07:57

## 2025-08-25 RX ADMIN — PROPOFOL 65 MCG/KG/MIN: 10 INJECTION, EMULSION INTRAVENOUS at 08:03

## 2025-08-25 RX ADMIN — PHENYLEPHRINE HYDROCHLORIDE 200 MCG: 10 INJECTION INTRAVENOUS at 08:43

## 2025-08-25 RX ADMIN — PROPOFOL 150 MG: 10 INJECTION, EMULSION INTRAVENOUS at 07:57

## 2025-08-25 RX ADMIN — ROCURONIUM BROMIDE 20 MG: 50 INJECTION, SOLUTION INTRAVENOUS at 09:06

## 2025-08-25 RX ADMIN — DEXAMETHASONE SODIUM PHOSPHATE 8 MG: 4 INJECTION, SOLUTION INTRA-ARTICULAR; INTRALESIONAL; INTRAMUSCULAR; INTRAVENOUS; SOFT TISSUE at 07:57

## 2025-08-25 RX ADMIN — PHENYLEPHRINE HYDROCHLORIDE 100 MCG: 10 INJECTION INTRAVENOUS at 08:50

## 2025-08-25 RX ADMIN — Medication 2 G: at 07:48

## 2025-08-25 RX ADMIN — PHENYLEPHRINE HYDROCHLORIDE 100 MCG: 10 INJECTION INTRAVENOUS at 09:06

## 2025-08-25 RX ADMIN — SODIUM CHLORIDE, SODIUM LACTATE, POTASSIUM CHLORIDE, AND CALCIUM CHLORIDE: .6; .31; .03; .02 INJECTION, SOLUTION INTRAVENOUS at 08:50

## 2025-08-25 RX ADMIN — PHENYLEPHRINE HYDROCHLORIDE 200 MCG: 10 INJECTION INTRAVENOUS at 09:12

## 2025-08-25 RX ADMIN — SODIUM CHLORIDE, SODIUM LACTATE, POTASSIUM CHLORIDE, AND CALCIUM CHLORIDE: .6; .31; .03; .02 INJECTION, SOLUTION INTRAVENOUS at 07:48

## 2025-08-25 RX ADMIN — ROCURONIUM BROMIDE 50 MG: 50 INJECTION, SOLUTION INTRAVENOUS at 07:59

## 2025-08-25 RX ADMIN — PHENYLEPHRINE HYDROCHLORIDE 100 MCG: 10 INJECTION INTRAVENOUS at 08:47

## 2025-08-25 RX ADMIN — PHENYLEPHRINE HYDROCHLORIDE 100 MCG: 10 INJECTION INTRAVENOUS at 09:15

## 2025-08-25 RX ADMIN — FENTANYL CITRATE 100 MCG: 50 INJECTION INTRAMUSCULAR; INTRAVENOUS at 07:57

## 2025-08-25 ASSESSMENT — ACTIVITIES OF DAILY LIVING (ADL)
ADLS_ACUITY_SCORE: 22
ADLS_ACUITY_SCORE: 24
ADLS_ACUITY_SCORE: 24
ADLS_ACUITY_SCORE: 22
ADLS_ACUITY_SCORE: 24
ADLS_ACUITY_SCORE: 22
ADLS_ACUITY_SCORE: 24
ADLS_ACUITY_SCORE: 22
ADLS_ACUITY_SCORE: 24
ADLS_ACUITY_SCORE: 18

## 2025-08-25 ASSESSMENT — ENCOUNTER SYMPTOMS: SEIZURES: 1

## 2025-08-26 ENCOUNTER — SURGERY (OUTPATIENT)
Age: 60
End: 2025-08-26
Payer: COMMERCIAL

## 2025-08-26 RX ADMIN — HEPARIN SODIUM 5000 UNITS: 1000 INJECTION INTRAVENOUS; SUBCUTANEOUS at 13:41

## 2025-08-26 RX ADMIN — IOPAMIDOL 60 ML: 755 INJECTION, SOLUTION INTRAVENOUS at 13:49

## 2025-08-26 RX ADMIN — FENTANYL CITRATE 50 MCG: 50 INJECTION INTRAMUSCULAR; INTRAVENOUS at 13:39

## 2025-08-26 RX ADMIN — LIDOCAINE HYDROCHLORIDE 1 ML: 10 INJECTION, SOLUTION EPIDURAL; INFILTRATION; INTRACAUDAL; PERINEURAL at 13:36

## 2025-08-26 RX ADMIN — Medication 100 MCG: at 13:36

## 2025-08-26 RX ADMIN — MIDAZOLAM 2 MG: 1 INJECTION INTRAMUSCULAR; INTRAVENOUS at 13:34

## 2025-08-26 RX ADMIN — FENTANYL CITRATE 100 MCG: 50 INJECTION INTRAMUSCULAR; INTRAVENOUS at 13:34

## 2025-08-26 RX ADMIN — FENTANYL CITRATE 50 MCG: 50 INJECTION INTRAMUSCULAR; INTRAVENOUS at 13:46

## 2025-08-26 RX ADMIN — VERAPAMIL HYDROCHLORIDE 1.25 MG: 2.5 INJECTION, SOLUTION INTRAVENOUS at 13:37

## 2025-08-26 ASSESSMENT — ACTIVITIES OF DAILY LIVING (ADL)
ADLS_ACUITY_SCORE: 48
ADLS_ACUITY_SCORE: 48
ADLS_ACUITY_SCORE: 40
ADLS_ACUITY_SCORE: 40
ADLS_ACUITY_SCORE: 48
ADLS_ACUITY_SCORE: 40
ADLS_ACUITY_SCORE: 48
ADLS_ACUITY_SCORE: 48
ADLS_ACUITY_SCORE: 46
ADLS_ACUITY_SCORE: 48
ADLS_ACUITY_SCORE: 40
ADLS_ACUITY_SCORE: 48
ADLS_ACUITY_SCORE: 46
ADLS_ACUITY_SCORE: 40
ADLS_ACUITY_SCORE: 48
ADLS_ACUITY_SCORE: 48

## 2025-08-27 ASSESSMENT — ACTIVITIES OF DAILY LIVING (ADL)
ADLS_ACUITY_SCORE: 44
ADLS_ACUITY_SCORE: 39
ADLS_ACUITY_SCORE: 44
ADLS_ACUITY_SCORE: 39
ADLS_ACUITY_SCORE: 44
ADLS_ACUITY_SCORE: 44
ADLS_ACUITY_SCORE: 39
ADLS_ACUITY_SCORE: 39
ADLS_ACUITY_SCORE: 44
ADLS_ACUITY_SCORE: 39
ADLS_ACUITY_SCORE: 44
ADLS_ACUITY_SCORE: 39
ADLS_ACUITY_SCORE: 44

## 2025-08-28 ENCOUNTER — APPOINTMENT (OUTPATIENT)
Dept: PHYSICAL THERAPY | Facility: CLINIC | Age: 60
DRG: 988 | End: 2025-08-28
Attending: HOSPITALIST
Payer: COMMERCIAL

## 2025-08-28 ENCOUNTER — ORDERS ONLY (AUTO-RELEASED) (OUTPATIENT)
Dept: ORTHOPEDICS | Facility: CLINIC | Age: 60
End: 2025-08-28

## 2025-08-28 DIAGNOSIS — I46.9 CARDIAC ARREST (H): ICD-10-CM

## 2025-08-28 ASSESSMENT — ACTIVITIES OF DAILY LIVING (ADL)
ADLS_ACUITY_SCORE: 39